# Patient Record
Sex: FEMALE | Race: BLACK OR AFRICAN AMERICAN | Employment: OTHER | URBAN - METROPOLITAN AREA
[De-identification: names, ages, dates, MRNs, and addresses within clinical notes are randomized per-mention and may not be internally consistent; named-entity substitution may affect disease eponyms.]

---

## 2017-04-17 ENCOUNTER — HOSPITAL ENCOUNTER (OUTPATIENT)
Dept: LAB | Age: 82
Discharge: HOME | End: 2017-04-17
Payer: MEDICARE

## 2017-04-17 ENCOUNTER — OFFICE VISIT (OUTPATIENT)
Dept: INTERNAL MEDICINE CLINIC | Age: 82
End: 2017-04-17

## 2017-04-17 VITALS
SYSTOLIC BLOOD PRESSURE: 148 MMHG | RESPIRATION RATE: 22 BRPM | HEART RATE: 77 BPM | TEMPERATURE: 98.1 F | DIASTOLIC BLOOD PRESSURE: 90 MMHG | WEIGHT: 166 LBS | OXYGEN SATURATION: 98 % | BODY MASS INDEX: 29.41 KG/M2 | HEIGHT: 63 IN

## 2017-04-17 DIAGNOSIS — F02.80 DEMENTIA DUE TO MEDICAL CONDITION WITHOUT BEHAVIORAL DISTURBANCE (HCC): ICD-10-CM

## 2017-04-17 DIAGNOSIS — F51.01 PRIMARY INSOMNIA: ICD-10-CM

## 2017-04-17 DIAGNOSIS — I10 ESSENTIAL HYPERTENSION: Primary | ICD-10-CM

## 2017-04-17 DIAGNOSIS — E55.9 VITAMIN D DEFICIENCY: ICD-10-CM

## 2017-04-17 DIAGNOSIS — E78.00 HYPERCHOLESTEROLEMIA: ICD-10-CM

## 2017-04-17 PROCEDURE — 80053 COMPREHEN METABOLIC PANEL: CPT

## 2017-04-17 PROCEDURE — 36415 COLL VENOUS BLD VENIPUNCTURE: CPT

## 2017-04-17 PROCEDURE — 82306 VITAMIN D 25 HYDROXY: CPT

## 2017-04-17 RX ORDER — LORAZEPAM 0.5 MG/1
0.5 TABLET ORAL
Qty: 30 TAB | Refills: 0 | Status: SHIPPED | OUTPATIENT
Start: 2017-04-17 | End: 2021-06-23 | Stop reason: ALTCHOICE

## 2017-04-17 RX ORDER — RIVASTIGMINE 13.3 MG/24H
1 PATCH, EXTENDED RELEASE TRANSDERMAL DAILY
Qty: 30 PATCH | Refills: 5 | Status: SHIPPED | OUTPATIENT
Start: 2017-04-17 | End: 2017-10-26 | Stop reason: SDUPTHER

## 2017-04-17 NOTE — PROGRESS NOTES
HISTORY OF PRESENT ILLNESS  Palma Seip is a 80 y.o. female here for follow up.has dementia,stable. on meds. need refill. Reports insomnia some days. No sundowning. Has HTN,on meds. no need for refill. Need lab work. No chest pain,or palpitation. Follow Up Chronic Condition   Pertinent negatives include no chest pain. Follow-up   Pertinent negatives include no chest pain. Hypertension    Pertinent negatives include no chest pain, no palpitations, no blurred vision and no nausea. Cholesterol Problem   Pertinent negatives include no chest pain. Dementia    Her past medical history is significant for hypertension. Review of Systems   Constitutional: Negative. Negative for chills and fever. HENT: Negative. Eyes: Negative. Negative for blurred vision and double vision. Respiratory: Negative. Negative for cough and hemoptysis. Cardiovascular: Negative. Negative for chest pain and palpitations. Gastrointestinal: Negative. Negative for heartburn and nausea. Genitourinary: Negative. Skin: Negative. Neurological: Negative. Psychiatric/Behavioral: Negative. Physical Exam   Constitutional: She appears well-developed and well-nourished. No distress. Neck: Normal range of motion. Neck supple. No JVD present. No thyromegaly present. Cardiovascular: Normal rate, regular rhythm, normal heart sounds and intact distal pulses. Pulmonary/Chest: Effort normal and breath sounds normal. No respiratory distress. She has no wheezes. Musculoskeletal: She exhibits no edema or tenderness. Psychiatric: She has a normal mood and affect. Her behavior is normal.       ASSESSMENT and Janel Lord was seen today for follow up chronic condition. Diagnoses and all orders for this visit:    Essential hypertension    On lotrel. 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   On namenda and exelon patch.  Will do,  -     METABOLIC PANEL, COMPREHENSIVE    Hypercholesterolemia    Stable. will do,  -     METABOLIC PANEL, COMPREHENSIVE    Vitamin D deficiency  -     VITAMIN D, 25 HYDROXY    Primary insomnia    Will refill,  -     LORazepam (ATIVAN) 0.5 mg tablet; Take 1 Tab by mouth nightly as needed for Anxiety. Max Daily Amount: 0.5 mg. Other orders  -     rivastigmine (EXELON) 13.3 mg/24 hour patch; 1 Patch by TransDERmal route daily. Discussed expected course/resolution/complications of diagnosis in detail with patient. Medication risks/benefits/costs/interactions/alternatives discussed with patient. Pt was given an after visit summary which includes diagnoses, current medications & vitals. Pt expressed understanding with the diagnosis and plan.

## 2017-04-17 NOTE — MR AVS SNAPSHOT
Visit Information Date & Time Provider Department Dept. Phone Encounter #  
 4/17/2017  1:00 PM Kellie Singleton, [address] [facility] [phone] [id number] Upcoming Health Maintenance Date Due DTaP/Tdap/Td series (1 - Tdap) 2/3/1954 GLAUCOMA SCREENING Q2Y 2/3/1998 Pneumococcal 65+ Low/Medium Risk (2 of 2 - PPSV23) 11/25/2013 MEDICARE YEARLY EXAM 8/5/2016 Allergies as of 4/17/2017  Review Complete On: 4/17/2017 By: Kellie Singleton MD  
  
 Severity Noted Reaction Type Reactions Pcn [Penicillins]  03/25/2010    Itching Current Immunizations  Reviewed on 8/5/2015 Name Date Influenza Vaccine Split 10/26/2012, 10/26/2011, 10/11/2010 Pneumococcal Vaccine (Unspecified Type) 11/25/2008 Not reviewed this visit You Were Diagnosed With   
  
 Codes Comments Essential hypertension    -  Primary ICD-10-CM: I10 
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 
ICD-9-CM: 307.42 Vitals BP Pulse Temp Resp Height(growth percentile) Weight(growth percentile) (!) 148/99 77 98.1 °F (36.7 °C) (Oral) 22 5' 3\" (1.6 m) 166 lb (75.3 kg) SpO2 BMI OB Status Smoking Status 98% 29.41 kg/m2 Postmenopausal Never Smoker Vitals History BMI and BSA Data Body Mass Index Body Surface Area  
 29.41 kg/m 2 1.83 m 2 Preferred Pharmacy Pharmacy Name Phone [facility] [address] [facility]  [place] [phone] Your Updated Medication List  
  
   
This list is accurate as of: 4/17/17  2:35 PM.  Always use your most recent med list.  
  
  
  
  
 acetaminophen 650 mg CR tablet Commonly known as:  TYLENOL ARTHRITIS PAIN  
 Take 1 Tab by mouth two (2) times daily as needed for Pain. alendronate 70 mg tablet Commonly known as:  FOSAMAX TAKE 1 TABLET BY MOUTH EVERY   
  
 amLODIPine-benazepril 5-20 mg per capsule Commonly known as:  LOTREL  
TAKE 1 CAPSULE BY MOUTH DAILY CALCIUM 500 WITH D 500 mg(1,250mg) -400 unit Tab Generic drug:  Calcium-Cholecalciferol (D3)  
TAKE 1 TABLET BY MOUTH EVERY DAY  
  
 LORazepam 0.5 mg tablet Commonly known as:  ATIVAN Take 1 Tab by mouth nightly as needed for Anxiety. Max Daily Amount: 0.5 mg.  
  
 NAMENDA XR 28 mg capsule Generic drug:  memantine ER  
TAKE 1 CAPSULE BY MOUTH DAILY  
  
 rivastigmine 13.3 mg/24 hour patch Commonly known as:  EXELON  
1 Patch by TransDERmal route daily. Prescriptions Printed Refills LORazepam (ATIVAN) 0.5 mg tablet 0 Sig: Take 1 Tab by mouth nightly as needed for Anxiety. Max Daily Amount: 0.5 mg.  
 Class: Print Route: Oral  
  
Prescriptions Sent to Pharmacy Refills  
 rivastigmine (EXELON) 13.3 mg/24 hour patch 5 Si Patch by TransDERmal route daily. Class: Normal  
 Pharmacy: [facility] [address] #: [phone] Route: TransDERmal  
  
We Performed the Following METABOLIC PANEL, COMPREHENSIVE [80548 CPT(R)] VITAMIN D, 25 HYDROXY [id number] CPT(R)] Please provide this summary of care documentation to your next provider. Your primary care clinician is listed as Darylene Skinner. If you have any questions after today's visit, please call [phone].

## 2017-04-17 NOTE — PROGRESS NOTES
Chief Complaint   Patient presents with    Follow Up Chronic Condition     Reviewed record  In preparation for visit and have obtained necessary documentation. 1. Have you been to the ER, urgent care clinic since your last visit? Hospitalized since your last visit? No    2. Have you seen or consulted any other health care providers outside of the [address] [place] [address] since your last visit? Include any pap smears or colon screening.  No    Used 2 patient I. D. 's

## 2017-04-17 NOTE — LETTER
4/21/2017 9:09 AM 
 
Ms. Jennifer Krueger [facility] [phone] Dear Jennifer Krueger: 
 
Please find your most recent results below. Resulted Orders METABOLIC PANEL, COMPREHENSIVE Result Value Ref Range Glucose 94 65 - 99 mg/dL BUN 10 8 - 27 mg/dL Creatinine 1.03 (H) 0.57 - 1.00 mg/dL GFR est non-AA 50 (L) >59 mL/min/1.73 GFR est AA 58 (L) >59 mL/min/1.73  
 BUN/Creatinine ratio 10 (L) 12 - 28 Sodium 143 134 - 144 mmol/L Potassium 4.5 3.5 - 5.2 mmol/L Chloride 102 96 - 106 mmol/L  
 CO2 27 18 - 29 mmol/L Calcium 9.7 8.7 - 10.3 mg/dL Protein, total 7.0 6.0 - 8.5 g/dL Albumin 3.9 3.5 - 4.7 g/dL GLOBULIN, TOTAL 3.1 1.5 - 4.5 g/dL A-G Ratio 1.3 1.2 - 2.2 Bilirubin, total 0.2 0.0 - 1.2 mg/dL Alk. phosphatase 87 39 - 117 IU/L  
 AST (SGOT) 18 0 - 40 IU/L  
 ALT (SGPT) 11 0 - 32 IU/L Narrative Performed at:  [address]  [id number] : Nelsy Alston MD, Phone:  [phone] VITAMIN D, 25 HYDROXY Result Value Ref Range VITAMIN D, 25-HYDROXY 31.5 30.0 - 100.0 ng/mL Comment:  
   Vitamin D deficiency has been defined by the [facility] practice guideline as a 
level of serum 25-OH vitamin D less than 20 ng/mL (1,2). The Endocrine Society went on to further define vitamin D 
insufficiency as a level between 21 and 29 ng/mL (2). 1. IOM ([place] of Medicine). 2010. Dietary reference 
   intakes for calcium and D. [facility]: The 
   [employer]. 2. Bienvenido MF, Sudhakar NC, Shan RING, et al. 
   Evaluation, treatment, and prevention of vitamin D 
   deficiency: an Endocrine Society clinical practice 
   guideline. JCEM. 2011 Jul; 96(7):0751-30. Narrative Performed at:  [address]  [id number] : Nelsy Alston MD, Phone:  [phone] CKD REPORT  
 Result Value Ref Range Interpretation Note Comment:  
   Supplement report is available. Narrative Performed at:  [address] [place] [address]  [id number] : Sarkis Portillo PhD, Phone:  [phone] RECOMMENDATIONS: 
 
Stable. Please call me if you have any questions: [phone] Sincerely, Jarred Knox MD

## 2017-04-18 LAB
25(OH)D3+25(OH)D2 SERPL-MCNC: 31.5 NG/ML (ref 30–100)
ALBUMIN SERPL-MCNC: 3.9 G/DL (ref 3.5–4.7)
ALBUMIN/GLOB SERPL: 1.3 {RATIO} (ref 1.2–2.2)
ALP SERPL-CCNC: 87 IU/L (ref 39–117)
ALT SERPL-CCNC: 11 IU/L (ref 0–32)
AST SERPL-CCNC: 18 IU/L (ref 0–40)
BILIRUB SERPL-MCNC: 0.2 MG/DL (ref 0–1.2)
BUN SERPL-MCNC: 10 MG/DL (ref 8–27)
BUN/CREAT SERPL: 10 (ref 12–28)
CALCIUM SERPL-MCNC: 9.7 MG/DL (ref 8.7–10.3)
CHLORIDE SERPL-SCNC: 102 MMOL/L (ref 96–106)
CO2 SERPL-SCNC: 27 MMOL/L (ref 18–29)
CREAT SERPL-MCNC: 1.03 MG/DL (ref 0.57–1)
GLOBULIN SER CALC-MCNC: 3.1 G/DL (ref 1.5–4.5)
GLUCOSE SERPL-MCNC: 94 MG/DL (ref 65–99)
INTERPRETATION: NORMAL
POTASSIUM SERPL-SCNC: 4.5 MMOL/L (ref 3.5–5.2)
PROT SERPL-MCNC: 7 G/DL (ref 6–8.5)
SODIUM SERPL-SCNC: 143 MMOL/L (ref 134–144)

## 2017-10-16 ENCOUNTER — OFFICE VISIT (OUTPATIENT)
Dept: INTERNAL MEDICINE CLINIC | Age: 82
End: 2017-10-16

## 2017-10-16 ENCOUNTER — HOSPITAL ENCOUNTER (OUTPATIENT)
Dept: LAB | Age: 82
Discharge: HOME | End: 2017-10-16
Payer: MEDICARE

## 2017-10-16 VITALS
WEIGHT: 161 LBS | OXYGEN SATURATION: 100 % | BODY MASS INDEX: 28.53 KG/M2 | HEART RATE: 62 BPM | DIASTOLIC BLOOD PRESSURE: 78 MMHG | TEMPERATURE: 97.3 F | SYSTOLIC BLOOD PRESSURE: 126 MMHG | HEIGHT: 63 IN | RESPIRATION RATE: 20 BRPM

## 2017-10-16 DIAGNOSIS — F02.80 DEMENTIA DUE TO MEDICAL CONDITION WITHOUT BEHAVIORAL DISTURBANCE (HCC): Primary | ICD-10-CM

## 2017-10-16 DIAGNOSIS — I10 ESSENTIAL HYPERTENSION: ICD-10-CM

## 2017-10-16 DIAGNOSIS — G47.00 INSOMNIA, UNSPECIFIED TYPE: ICD-10-CM

## 2017-10-16 DIAGNOSIS — Z00.00 MEDICARE ANNUAL WELLNESS VISIT, SUBSEQUENT: ICD-10-CM

## 2017-10-16 DIAGNOSIS — M17.9 OSTEOARTHRITIS OF KNEE, UNSPECIFIED LATERALITY, UNSPECIFIED OSTEOARTHRITIS TYPE: ICD-10-CM

## 2017-10-16 DIAGNOSIS — Z13.5 SCREENING FOR GLAUCOMA: ICD-10-CM

## 2017-10-16 DIAGNOSIS — Z23 ENCOUNTER FOR IMMUNIZATION: ICD-10-CM

## 2017-10-16 DIAGNOSIS — E78.00 HYPERCHOLESTEROLEMIA: ICD-10-CM

## 2017-10-16 PROCEDURE — 80053 COMPREHEN METABOLIC PANEL: CPT

## 2017-10-16 PROCEDURE — 36415 COLL VENOUS BLD VENIPUNCTURE: CPT

## 2017-10-16 PROCEDURE — 80061 LIPID PANEL: CPT

## 2017-10-16 PROCEDURE — 85027 COMPLETE CBC AUTOMATED: CPT

## 2017-10-16 RX ORDER — MELOXICAM 7.5 MG/1
TABLET ORAL
Qty: 90 TAB | Refills: 5 | Status: SHIPPED | OUTPATIENT
Start: 2017-10-16 | End: 2019-01-08 | Stop reason: SDUPTHER

## 2017-10-16 RX ORDER — MELOXICAM 7.5 MG/1
7.5 TABLET ORAL DAILY
Qty: 30 TAB | Refills: 5 | Status: SHIPPED | OUTPATIENT
Start: 2017-10-16 | End: 2017-10-16 | Stop reason: SDUPTHER

## 2017-10-16 RX ORDER — DOXEPIN HYDROCHLORIDE 10 MG/1
CAPSULE ORAL
Qty: 90 CAP | Refills: 2 | Status: SHIPPED | OUTPATIENT
Start: 2017-10-16 | End: 2019-10-14 | Stop reason: ALTCHOICE

## 2017-10-16 RX ORDER — DOXEPIN HYDROCHLORIDE 10 MG/1
10 CAPSULE ORAL
Qty: 30 CAP | Refills: 2 | Status: SHIPPED | OUTPATIENT
Start: 2017-10-16 | End: 2017-10-16 | Stop reason: SDUPTHER

## 2017-10-16 NOTE — PATIENT INSTRUCTIONS
Schedule of Personalized Health Plan  (Provide Copy to Patient)  The best way to stay healthy is to live a healthy lifestyle. A healthy lifestyle includes regular exercise, eating a well-balanced diet, keeping a healthy weight and not smoking. Regular physical exams and screening tests are another important way to take care of yourself. Preventive exams provided by health care providers can find health problems early when treatment works best and can keep you from getting certain diseases or illnesses. Preventive services include exams, lab tests, screenings, shots, monitoring and information to help you take care of your own health. All people over 65 should have a pneumonia shot. Pneumonia shots are usually only needed once in a lifetime unless your doctor decides differently. Ordered. All people over 65 should have a yearly flu shot. ordered. People over 65 are at medium to high risk for Hepatitis B. Three shots are needed for complete protection. In addition to your physical exam, some screening tests are recommended:    Bone mass measurement (dexa scan) is recommended every two years  Diabetes Mellitus screening is recommended every year. Glaucoma is an eye disease caused by high pressure in the eye. An eye exam is recommended every year. Ordered. Cardiovascular screening tests that check your cholesterol and other blood fat (lipid) levels are recommended every five years. Up tod ate    Colorectal Cancer screening tests help to find pre-cancerous polyps (growths in the colon) so they can be removed before they turn into cancer. Tests ordered for screening depend on your personal and family history risk factors. N/A    Screening for Breast Cancer is recommended yearly with a mammogram.N/A    Screening for Cervical Cancer is recommended every two years (annually for certain risk factors, such as previous history of STD or abnormal PAP in past 7 years), with a Pelvic Exam with PAP    Here is a list of your current Health Maintenance items with a due date:  Health Maintenance   Topic Date Due    DTaP/Tdap/Td series (1 - Tdap) 02[birth year]03/1954    GLAUCOMA SCREENING Q2Y  02/03/1998    Pneumococcal 65+ Low/Medium Risk (2 of 2 - PPSV23) 11/25/2013    MEDICARE YEARLY EXAM  08/05/2016    INFLUENZA AGE 9 TO ADULT  08/01/2017    OSTEOPOROSIS SCREENING (DEXA)  Completed    ZOSTER VACCINE AGE 60>  Addressed       Vaccine Information Statement    Influenza (Flu) Vaccine (Inactivated or Recombinant): What you need to know    Many Vaccine Information Statements are available in [place] and other languages. See www.immunize.org/vis  Hojas de Información Sobre Vacunas están disponibles en Español y en muchos otros idiomas. Visite www.immunize.org/vis    1. Why get vaccinated? Influenza (flu) is a contagious disease that spreads around the [facility] every year, usually between October and May. Flu is caused by influenza viruses, and is spread mainly by coughing, sneezing, and close contact. Anyone can get flu. Flu strikes suddenly and can last several days. Symptoms vary by age, but can include:   fever/chills   sore throat   muscle aches   fatigue   cough   headache    runny or stuffy nose    Flu can also lead to pneumonia and blood infections, and cause diarrhea and seizures in children. If you have a medical condition, such as heart or lung disease, flu can make it worse. Flu is more dangerous for some people. Infants and young children, people 72years of age and older, pregnant women, and people with certain health conditions or a weakened immune system are at greatest risk. Each year thousands of people in the [facility] die from flu, and many more are hospitalized. Flu vaccine can:   keep you from getting flu,   make flu less severe if you do get it, and   keep you from spreading flu to your family and other people.      2. Inactivated and recombinant flu vaccines    A dose of flu vaccine is recommended every flu season. Children 6 months through 6years of age may need two doses during the same flu season. Everyone else needs only one dose each flu season. Some inactivated flu vaccines contain a very small amount of a mercury-based preservative called thimerosal. Studies have not shown thimerosal in vaccines to be harmful, but flu vaccines that do not contain thimerosal are available. There is no live flu virus in flu shots. They cannot cause the flu. There are many flu viruses, and they are always changing. Each year a new flu vaccine is made to protect against three or four viruses that are likely to cause disease in the upcoming flu season. But even when the vaccine doesnt exactly match these viruses, it may still provide some protection    Flu vaccine cannot prevent:   flu that is caused by a virus not covered by the vaccine, or   illnesses that look like flu but are not. It takes about 2 weeks for protection to develop after vaccination, and protection lasts through the flu season. 3. Some people should not get this vaccine    Tell the person who is giving you the vaccine:     If you have any severe, life-threatening allergies. If you ever had a life-threatening allergic reaction after a dose of flu vaccine, or have a severe allergy to any part of this vaccine, you may be advised not to get vaccinated. Most, but not all, types of flu vaccine contain a small amount of egg protein.  If you ever had Guillain-Barré Syndrome (also called GBS). Some people with a history of GBS should not get this vaccine. This should be discussed with your doctor.  If you are not feeling well. It is usually okay to get flu vaccine when you have a mild illness, but you might be asked to come back when you feel better. 4. Risks of a vaccine reaction    With any medicine, including vaccines, there is a chance of reactions.  These are usually mild and go away on their own, but serious reactions are also possible. Most people who get a flu shot do not have any problems with it. Minor problems following a flu shot include:    soreness, redness, or swelling where the shot was given     hoarseness   sore, red or itchy eyes   cough   fever   aches   headache   itching   fatigue  If these problems occur, they usually begin soon after the shot and last 1 or 2 days. More serious problems following a flu shot can include the following:     There may be a small increased risk of Guillain-Barré Syndrome (GBS) after inactivated flu vaccine. This risk has been estimated at 1 or 2 additional cases per million people vaccinated. This is much lower than the risk of severe complications from flu, which can be prevented by flu vaccine.  Young children who get the flu shot along with pneumococcal vaccine (PCV13) and/or DTaP vaccine at the same time might be slightly more likely to have a seizure caused by fever. Ask your doctor for more information. Tell your doctor if a child who is getting flu vaccine has ever had a seizure. Problems that could happen after any injected vaccine:      People sometimes faint after a medical procedure, including vaccination. Sitting or lying down for about 15 minutes can help prevent fainting, and injuries caused by a fall. Tell your doctor if you feel dizzy, or have vision changes or ringing in the ears.  Some people get severe pain in the shoulder and have difficulty moving the arm where a shot was given. This happens very rarely.  Any medication can cause a severe allergic reaction. Such reactions from a vaccine are very rare, estimated at about 1 in a million doses, and would happen within a few minutes to a few hours after the vaccination. As with any medicine, there is a very remote chance of a vaccine causing a serious injury or death. The safety of vaccines is always being monitored.  For more information, visit: www.cdc.gov/vaccinesafety/    5. What if there is a serious reaction? What should I look for?  Look for anything that concerns you, such as signs of a severe allergic reaction, very high fever, or unusual behavior. Signs of a severe allergic reaction can include hives, swelling of the face and throat, difficulty breathing, a fast heartbeat, dizziness, and weakness  usually within a few minutes to a few hours after the vaccination. What should I do?  If you think it is a severe allergic reaction or other emergency that cant wait, call 9-1-1 and get the person to the nearest hospital. Otherwise, call your doctor.  Reactions should be reported to the Vaccine Adverse Event Reporting System (VAERS). Your doctor should file this report, or you can do it yourself through  the VAERS web site at www.vaers. hhs.gov, or by calling 9-514.626.3898. VAERS does not give medical advice. 6. The National Vaccine Injury Compensation Program    The [facility] Vaccine Injury Compensation Program (VICP) is a federal program that was created to compensate people who may have been injured by certain vaccines. Persons who believe they may have been injured by a vaccine can learn about the program and about filing a claim by calling 6-192.257.6600 or visiting the [facility] [employer] website at www.[facility].gov/vaccinecompensation. There is a time limit to file a claim for compensation. 7. How can I learn more?  Ask your healthcare provider. He or she can give you the vaccine package insert or suggest other sources of information.  Call your local or state health department.  Contact the Centers for Disease Control and Prevention (CDC):  - Call 2-468.738.3227 (1-800-CDC-INFO) or  - Visit CDCs website at www.cdc.gov/flu    Vaccine Information Statement   Inactivated Influenza Vaccine   8/7/2015  42 ROMIE Pitts [id number]    Department of Health and Human Services  Centers for Disease Control and Prevention    Office Use Only    Vaccine Information Statement     Pneumococcal Conjugate Vaccine (PCV13): What You Need to Know    Many Vaccine Information Statements are available in [place] and other languages. See www.immunize.org/vis. Hojas de información Sobre Vacunas están disponibles en español y en muchos otros idiomas. Visite www.immunize.org/vis. 1. Why get vaccinated? Vaccination can protect both children and adults from pneumococcal disease. Pneumococcal disease is caused by bacteria that can spread from person to person through close contact. It can cause ear infections, and it can also lead to more serious infections of the:   Lungs (pneumonia),   Blood (bacteremia), and   Covering of the brain and spinal cord (meningitis). Pneumococcal pneumonia is most common among adults. Pneumococcal meningitis can cause deafness and brain damage, and it kills about 1 child in 10 who get it. Anyone can get pneumococcal disease, but children under 3years of age and adults 72 years and older, people with certain medical conditions, and cigarette smokers are at the highest risk. Before there was a vaccine, the [facility] saw:   more than 700 cases of meningitis,   about 13,000 blood infections,   about 5 million ear infections, and   about 200 deaths  in children under 5 each year from pneumococcal disease. Since vaccine became available, severe pneumococcal disease in these children has fallen by 88%. About 18,000 older adults die of pneumococcal disease each year in the [place]. Treatment of pneumococcal infections with penicillin and other drugs is not as effective as it used to be, because some strains of the disease have become resistant to these drugs. This makes prevention of the disease, through vaccination, even more important. 2. PCV13 vaccine    Pneumococcal conjugate vaccine (called PCV13) protects against 13 types of pneumococcal bacteria.       PCV13 is routinely given to children at 2, 4, 6, and 1515 months of age. It is also recommended for children and adults 3to 59years of age with certain health conditions, and for all adults 72years of age and older. Your doctor can give you details. 3. Some people should not get this vaccine    Anyone who has ever had a life-threatening allergic reaction to a dose of this vaccine, to an earlier pneumococcal vaccine called PCV7, or to any vaccine containing diphtheria toxoid (for example, DTaP), should not get PCV13. Anyone with a severe allergy to any component of PCV13 should not get the vaccine. Tell your doctor if the person being vaccinated has any severe allergies. If the person scheduled for vaccination is not feeling well, your healthcare provider might decide to reschedule the shot on another day. 4. Risks of a vaccine reaction    With any medicine, including vaccines, there is a chance of reactions. These are usually mild and go away on their own, but serious reactions are also possible. Problems reported following PCV13 varied by age and dose in the series. The most common problems reported among children were:    About half became drowsy after the shot, had a temporary loss of appetite, or had redness or tenderness where the shot was given.  About 1 out of 3 had swelling where the shot was given.  About 1 out of 3 had a mild fever, and about 1 in 20 had a fever over 102.2°F.   Up to about 8 out of 10 became fussy or irritable. Adults have reported pain, redness, and swelling where the shot was given; also mild fever, fatigue, headache, chills, or muscle pain. Francy Lord children who get PCV13 along with inactivated flu vaccine at the same time may be at increased risk for seizures caused by fever. Ask your doctor for more information. Problems that could happen after any vaccine:     People sometimes faint after a medical procedure, including vaccination.  Sitting or lying down for about 15 minutes can help prevent fainting, and injuries caused by a fall. Tell your doctor if you feel dizzy, or have vision changes or ringing in the ears.  Some older children and adults get severe pain in the shoulder and have difficulty moving the arm where a shot was given. This happens very rarely.  Any medication can cause a severe allergic reaction. Such reactions from a vaccine are very rare, estimated at about 1 in a million doses, and would happen within a few minutes to a few hours after the vaccination. As with any medicine, there is a very small chance of a vaccine causing a serious injury or death. The safety of vaccines is always being monitored. For more information, visit: www.cdc.gov/vaccinesafety/     5. What if there is a serious reaction? What should I look for?  Look for anything that concerns you, such as signs of a severe allergic reaction, very high fever, or unusual behavior. Signs of a severe allergic reaction can include hives, swelling of the face and throat, difficulty breathing, a fast heartbeat, dizziness, and weakness  usually within a few minutes to a few hours after the vaccination. What should I do?  If you think it is a severe allergic reaction or other emergency that cant wait, call 9-1-1 or get the person to the nearest hospital. Otherwise, call your doctor. Reactions should be reported to the Vaccine Adverse Event Reporting System (VAERS). Your doctor should file this report, or you can do it yourself through the VAERS web site at www.vaers. hhs.gov, or by calling 2-213.484.8364. VAERS does not give medical advice. 6. The National Vaccine Injury Compensation Program    The [facility] Vaccine Injury Compensation Program (VICP) is a federal program that was created to compensate people who may have been injured by certain vaccines.     Persons who believe they may have been injured by a vaccine can learn about the program and about filing a claim by calling 9-600.629.9209 or visiting the [address] website at www.[facility].gov/vaccinecompensation. There is a time limit to file a claim for compensation. 7. How can I learn more?  Ask your healthcare provider. He or she can give you the vaccine package insert or suggest other sources of information.  Call your local or state health department.  Contact the Centers for Disease Control and Prevention (CDC):  - Call 3-656.726.7313 (1-800-CDC-INFO) or  - Visit CDCs website at www.cdc.gov/vaccines    Vaccine Information Statement   PCV13 Vaccine   11/5/2015   42 ROMIE Almodovar [id number]    Department of Health and Human Services  Centers for Disease Control and Prevention    Office Use Only

## 2017-10-16 NOTE — LETTER
NOTIFICATION RETURN TO WORK / SCHOOL 
 
10/16/2017 9:45 AM 
 
Ms. Luli Olivo [facility] 7 [phone] To Whom It May Concern: 
 
Luli Olivo is currently under the care of [address] [place]. Her son Pao Keene brought her into the office Monday October 16, 2017 for an  
8:45 appointment. If there are questions or concerns please have the patient contact our office. Sincerely, Thanh Nolasco MD

## 2017-10-16 NOTE — PROGRESS NOTES
Health Maintenance Due   Topic Date Due    DTaP/Tdap/Td series (1 - Tdap) 02/03/1954    GLAUCOMA SCREENING Q2Y  02/03/1998    Pneumococcal 65+ Low/Medium Risk (2 of 2 - PPSV23) 11/25/2013    MEDICARE YEARLY EXAM  08/05/2016    INFLUENZA AGE 9 TO ADULT  08/01/2017       Chief Complaint   Patient presents with    Hypertension     6 month follow up    Dementia    Cholesterol Problem    Annual Wellness Visit    Insomnia     son states ativan not effective, pt is up all night every night       1. Have you been to the ER, urgent care clinic since your last visit? Hospitalized since your last visit? No    2. Have you seen or consulted any other health care providers outside of the [address] [place] [address] since your last visit? Include any pap smears or colon screening. No    3) Do you have an Advance Directive on file? yes    4) Are you interested in receiving information on Advance Directives? NO      Patient is accompanied by son I have received verbal consent from Marianne Tobias to discuss any/all medical information while they are present in the room.

## 2017-10-16 NOTE — LETTER
10/24/2017 5:34 PM 
 
Ms. Carolina Sullivan [facility] [phone] Dear Carolina Sullivan: 
 
Please find your most recent results below. Resulted Orders METABOLIC PANEL, COMPREHENSIVE Result Value Ref Range Glucose 104 (H) 65 - 99 mg/dL BUN 10 8 - 27 mg/dL Creatinine 1.26 (H) 0.57 - 1.00 mg/dL GFR est non-AA 39 (L) >59 mL/min/1.73 GFR est AA 45 (L) >59 mL/min/1.73  
 BUN/Creatinine ratio 8 (L) 12 - 28 Sodium 142 134 - 144 mmol/L Potassium 4.7 3.5 - 5.2 mmol/L Chloride 102 96 - 106 mmol/L  
 CO2 25 18 - 29 mmol/L Calcium 9.2 8.7 - 10.3 mg/dL Protein, total 7.2 6.0 - 8.5 g/dL Albumin 4.1 3.5 - 4.7 g/dL GLOBULIN, TOTAL 3.1 1.5 - 4.5 g/dL A-G Ratio 1.3 1.2 - 2.2 Bilirubin, total 0.4 0.0 - 1.2 mg/dL Alk. phosphatase 89 39 - 117 IU/L  
 AST (SGOT) 18 0 - 40 IU/L  
 ALT (SGPT) 10 0 - 32 IU/L Narrative Performed at:  [address]  [id number] : Gayathri Mendiola MD, Phone:  [phone] CBC W/O DIFF Result Value Ref Range WBC 4.9 3.4 - 10.8 x10E3/uL  
 RBC 5.67 (H) 3.77 - 5.28 x10E6/uL HGB 13.0 11.1 - 15.9 g/dL HCT 39.0 34.0 - 46.6 % MCV 69 (L) 79 - 97 fL  
 MCH 22.9 (L) 26.6 - 33.0 pg  
 MCHC 33.3 31.5 - 35.7 g/dL  
 RDW 15.4 12.3 - 15.4 % PLATELET 878 497 - 102 x10E3/uL Narrative Performed at:  [address]  [id number] : Gayathri Mendiola MD, Phone:  [phone] LIPID PANEL Result Value Ref Range Cholesterol, total 184 100 - 199 mg/dL Triglyceride 75 0 - 149 mg/dL HDL Cholesterol 49 >39 mg/dL VLDL, calculated 15 5 - 40 mg/dL LDL, calculated 120 (H) 0 - 99 mg/dL Narrative Performed at:  [address]  [id number] : Gayathri Mendiola MD, Phone:  [phone] CVD REPORT Result Value Ref Range  INTERPRETATION NTAP   
 PDF IMAGE Not applicable Narrative Performed at:  [address] [place] [address]  [id number] : Mohamud Hood PhD, Phone:  [phone] CKD REPORT Result Value Ref Range Interpretation Note Comment:  
   Supplement report is available. Narrative Performed at:  [address] [place] [address]  [id number] : Mohamud Hood PhD, Phone:  [phone] RECOMMENDATIONS: 
 
Slightly reduced kidney function. need tod rink more fluid. all other labs are stable. Please call me if you have any questions: [phone] Sincerely, Thanh Nolasco MD

## 2017-10-16 NOTE — PROGRESS NOTES
Chayito Bowen is a 80 y.o. female and presents for annual Medicare Wellness Visit. Problem List: Reviewed with patient and discussed risk factors. 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    Depressive disorder, not elsewhere classified F32.9    Anxiety state, unspecified F41.1    Moderate dementia F03.90    Advance care planning Z71.89       Current medical providers:  Patient Care Team:  Brian Day MD as PCP - General (Internal Medicine)  Candice Otero, RN as Ambulatory Care Navigator  Maryanne Wellington, RN as Nurse Navigator  [facility] A Shawn Tyson as Physician (Psychology)  Dianna Rush LPN as Ambulatory Care Navigator (Internal Medicine)    PSH: Reviewed with patient  Past Surgical History:   Procedure Laterality Date    CARDIAC CATHETERIZATION  10/1990    normal    COLONOSCOPY  [id number]    diverticuli, hemorrhoids    EGD      x 3    EGD  [id number]    gastritis, duodenitis    HX CATARACT REMOVAL      bilateral    HX GYN      BTL    HX HEENT      tonsillectomy    HX ORTHOPAEDIC  2/2011    right knee replacement- Dr. Parag Srerato    HX ORTHOPAEDIC  12/10/12     LEFT TOTAL KNEE ARTHROPLASTY  WITH NAVIGATION (EPI W SPINAL ANES).     HX OTHER SURGICAL  02/2002    removal of meningioma    HX OTHER SURGICAL      colonoscopy/EGD    NEUROLOGICAL PROCEDURE UNLISTED      brain tumor resection yrs back        SH: Reviewed with patient  Social History   Substance Use Topics    Smoking status: Never Smoker    Smokeless tobacco: Never Used    Alcohol use No       FH: Reviewed with patient  Family History   Problem Relation Age of Onset    Hypertension Mother     Elevated Lipids Mother     Dementia Mother     Elevated Lipids Sister     Dementia Sister     Cancer Brother      colon  No Known Problems Child     No Known Problems Child     No Known Problems Child     No Known Problems Child        Medications/Allergies: Reviewed with patient  Current Outpatient Prescriptions on File Prior to Visit   Medication Sig Dispense Refill    rivastigmine (EXELON) 13.3 mg/24 hour patch 1 Patch by TransDERmal route daily. 30 Patch 5    LORazepam (ATIVAN) 0.5 mg tablet Take 1 Tab by mouth nightly as needed for Anxiety. Max Daily Amount: 0.5 mg. 30 Tab 0    amLODIPine-benazepril (LOTREL) 5-20 mg per capsule TAKE 1 CAPSULE BY MOUTH DAILY 90 Cap 3    NAMENDA XR 28 mg capsule TAKE 1 CAPSULE BY MOUTH DAILY 90 Cap 5    alendronate (FOSAMAX) 70 mg tablet TAKE 1 TABLET BY MOUTH EVERY SUNDAY 12 Tab 12    CALCIUM 500 WITH D 500 mg(1,250mg) -400 unit tab TAKE 1 TABLET BY MOUTH EVERY DAY 30 Tab 0    acetaminophen (TYLENOL ARTHRITIS PAIN) 650 mg CR tablet Take 1 Tab by mouth two (2) times daily as needed for Pain. 60 Tab 3     No current facility-administered medications on file prior to visit. Allergies   Allergen Reactions    Pcn [Penicillins] Itching       Objective:  Visit Vitals    /78 (BP 1 Location: Left arm, BP Patient Position: Sitting)    Pulse 62    Temp 97.3 °F (36.3 °C) (Oral)    Resp 20    Ht 5' 3\" (1.6 m)    Wt 161 lb (73 kg)    SpO2 100%    BMI 28.52 kg/m2    Body mass index is 28.52 kg/(m^2). Assessment of cognitive impairment: Alert and oriented x 3    Depression Screen:   PHQ over the last two weeks 8/5/2015   Little interest or pleasure in doing things Several days   Feeling down, depressed or hopeless Several days   Total Score PHQ 2 2       Fall Risk Assessment:    Fall Risk Assessment, last 12 mths 10/16/2017   Able to walk? Yes   Fall in past 12 months? No       Functional Ability:   Does the patient exhibit a steady gait? yes   How long did it take the patient to get up and walk from a sitting position?  30 sec   Is the patient self reliant?  (ie can do own laundry, meals, household chores)  no     Does the patient handle his/her own medications?  no     Does the patient handle his/her own money? no     Is the patients home safe (ie good lighting, handrails on stairs and bath, etc.)? no     Did you notice or did patient express any hearing difficulties? yes     Did you notice or did patient express any vision difficulties?   no     Were distance and reading eye charts used? no       Advance Care Planning:   Patient was offered the opportunity to discuss advance care planning:  yes     Does patient have an Advance Directive:  yes   If no, did you provide information on [facility]? yes       Plan:      Orders Placed This Encounter    PNEUMOCOCCAL CONJ VACCINE 13 VALENT IM    INFLUENZA VIRUS VACCINE, HIGH DOSE SEASONAL, PRESERVATIVE FREE    METABOLIC PANEL, COMPREHENSIVE    CBC W/O DIFF    LIPID PANEL    doxepin (SINEQUAN) 10 mg capsule    meloxicam (MOBIC) 7.5 mg tablet       Health Maintenance   Topic Date Due    DTaP/Tdap/Td series (1 - Tdap) 02/03/1954    GLAUCOMA SCREENING Q2Y  02/03/1998    Pneumococcal 65+ Low/Medium Risk (2 of 2 - PPSV23) 11/25/2013    MEDICARE YEARLY EXAM  08/05/2016    INFLUENZA AGE 9 TO ADULT  08/01/2017    OSTEOPOROSIS SCREENING (DEXA)  Completed    ZOSTER VACCINE AGE 60>  Addressed       *Patient verbalized understanding and agreement with the plan. A copy of the After Visit Summary with personalized health plan was given to the patient today.

## 2017-10-16 NOTE — MR AVS SNAPSHOT
Visit Information Date & Time Provider Department Dept. Phone Encounter #  
 10/16/2017  8:45 AM Saul Walker, [address] [facility] [phone] [id number] Upcoming Health Maintenance Date Due DTaP/Tdap/Td series (1 - Tdap) 2/3/1954 GLAUCOMA SCREENING Q2Y 2/3/1998 Pneumococcal 65+ Low/Medium Risk (2 of 2 - PPSV23) 11/25/2013 MEDICARE YEARLY EXAM 8/5/2016 INFLUENZA AGE 9 TO ADULT 8/1/2017 Allergies as of 10/16/2017  Review Complete On: 10/16/2017 By: Saul Walker MD  
  
 Severity Noted Reaction Type Reactions Pcn [Penicillins]  03/25/2010    Itching Current Immunizations  Reviewed on 10/16/2017 Name Date Influenza High Dose Vaccine PF  Incomplete Influenza Vaccine Split 10/26/2012, 10/26/2011, 10/11/2010 Pneumococcal Conjugate (PCV-13)  Incomplete ZZZ-RETIRED (DO NOT USE) Pneumococcal Vaccine (Unspecified Type) 11/25/2008 Reviewed by Saul Walker MD on 10/16/2017 at  9:23 AM  
You Were Diagnosed With   
  
 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 
ICD-9-CM: 401.9 Hypercholesterolemia     ICD-10-CM: E78.00 ICD-9-CM: 272.0 Insomnia, unspecified type     ICD-10-CM: G47.00 ICD-9-CM: 780.52 Osteoarthritis of knee, unspecified laterality, unspecified osteoarthritis type     ICD-10-CM: M17.10 ICD-9-CM: 715.36 Encounter for immunization     ICD-10-CM: D50 ICD-9-CM: V03.89 Screening for glaucoma     ICD-10-CM: Z13.5 ICD-9-CM: V80.1 Vitals BP Pulse Temp Resp Height(growth percentile) Weight(growth percentile) 126/78 (BP 1 Location: Left arm, BP Patient Position: Sitting) 62 97.3 °F (36.3 °C) (Oral) 20 5' 3\" (1.6 m) 161 lb (73 kg) SpO2 BMI OB Status Smoking Status 100% 28.52 kg/m2 Postmenopausal Never Smoker Vitals History BMI and BSA Data Body Mass Index Body Surface Area 28.52 kg/m 2 1.8 m 2 Preferred Pharmacy Pharmacy Name Phone [facility] [address]  [place] [phone] Your Updated Medication List  
  
   
This list is accurate as of: 10/16/17  9:36 AM.  Always use your most recent med list.  
  
  
  
  
 acetaminophen 650 mg CR tablet Commonly known as:  TYLENOL ARTHRITIS PAIN Take 1 Tab by mouth two (2) times daily as needed for Pain. alendronate 70 mg tablet Commonly known as:  FOSAMAX TAKE 1 TABLET BY MOUTH EVERY SUNDAY  
  
 amLODIPine-benazepril 5-20 mg per capsule Commonly known as:  LOTREL  
TAKE 1 CAPSULE BY MOUTH DAILY CALCIUM 500 WITH D 500 mg(1,250mg) -400 unit Tab Generic drug:  Calcium-Cholecalciferol (D3)  
TAKE 1 TABLET BY MOUTH EVERY DAY  
  
 doxepin 10 mg capsule Commonly known as:  SINEquan Take 1 Cap by mouth nightly. D/C ativan LORazepam 0.5 mg tablet Commonly known as:  ATIVAN Take 1 Tab by mouth nightly as needed for Anxiety. Max Daily Amount: 0.5 mg.  
  
 meloxicam 7.5 mg tablet Commonly known as:  MOBIC Take 1 Tab by mouth daily. NAMENDA XR 28 mg capsule Generic drug:  memantine ER  
TAKE 1 CAPSULE BY MOUTH DAILY  
  
 rivastigmine 13.3 mg/24 hour patch Commonly known as:  EXELON  
1 Patch by TransDERmal route daily. Prescriptions Sent to Pharmacy Refills  
 doxepin (SINEQUAN) 10 mg capsule 2 Sig: Take 1 Cap by mouth nightly. D/C ativan Class: Normal  
 Pharmacy: [facility] [address] Ph #: [phone] Route: Oral  
 meloxicam (MOBIC) 7.5 mg tablet 5 Sig: Take 1 Tab by mouth daily. Class: Normal  
 Pharmacy: [facility] [address] Ph #: [phone]  Route: Oral  
  
 We Performed the Following CBC W/O DIFF [49885 CPT(R)] INFLUENZA VIRUS VACCINE, HIGH DOSE SEASONAL, PRESERVATIVE FREE [05519 CPT(R)] LIPID PANEL [75931 CPT(R)] METABOLIC PANEL, COMPREHENSIVE [62554 CPT(R)] PNEUMOCOCCAL CONJ VACCINE 13 VALENT IM [id number] CPT(R)] REFERRAL TO OPHTHALMOLOGY [REF57 Custom] Comments:  
 Glaucoma screening Referral Information Referral ID Referred By Referred To  
  
 [id number] KATIE [facility] [address] [facility], [address] Visits Status Start Date End Date 1 New Request 10/16/17 10/16/18 If your referral has a status of pending review or denied, additional information will be sent to support the outcome of this decision. Patient Instructions Schedule of Personalized Health Plan (Provide Copy to Patient) The best way to stay healthy is to live a healthy lifestyle. A healthy lifestyle includes regular exercise, eating a well-balanced diet, keeping a healthy weight and not smoking. Regular physical exams and screening tests are another important way to take care of yourself. Preventive exams provided by health care providers can find health problems early when treatment works best and can keep you from getting certain diseases or illnesses. Preventive services include exams, lab tests, screenings, shots, monitoring and information to help you take care of your own health. All people over 65 should have a pneumonia shot. Pneumonia shots are usually only needed once in a lifetime unless your doctor decides differently. Ordered. All people over 65 should have a yearly flu shot. ordered. People over 65 are at medium to high risk for Hepatitis B. Three shots are needed for complete protection. In addition to your physical exam, some screening tests are recommended: 
 
Bone mass measurement (dexa scan) is recommended every two years Diabetes Mellitus screening is recommended every year. Glaucoma is an eye disease caused by high pressure in the eye. An eye exam is recommended every year. Ordered. Cardiovascular screening tests that check your cholesterol and other blood fat (lipid) levels are recommended every five years. Up tod ate Colorectal Cancer screening tests help to find pre-cancerous polyps (growths in the colon) so they can be removed before they turn into cancer. Tests ordered for screening depend on your personal and family history risk factors. N/A Screening for Breast Cancer is recommended yearly with a mammogram.N/A Screening for Cervical Cancer is recommended every two years (annually for certain risk factors, such as previous history of STD or abnormal PAP in past 7 years), with a Pelvic Exam with PAP Here is a list of your current Health Maintenance items with a due date: 
Health Maintenance Topic Date Due  
 DTaP/Tdap/Td series (1 - Tdap) 02/03/1954  GLAUCOMA SCREENING Q2Y  02/03/1998  Pneumococcal 65+ Low/Medium Risk (2 of 2 - PPSV23) 11/25/2013  MEDICARE YEARLY EXAM  08/05/2016  INFLUENZA AGE 9 TO ADULT  08/01/2017  
 OSTEOPOROSIS SCREENING (DEXA)  Completed  ZOSTER VACCINE AGE 60>  Addressed Vaccine Information Statement Influenza (Flu) Vaccine (Inactivated or Recombinant): What you need to know Many Vaccine Information Statements are available in Swedish and other languages. See www.immunize.org/vis Hojas de Información Sobre Vacunas están disponibles en Español y en muchos otros idiomas. Visite www.immunize.org/vis 1. Why get vaccinated? Influenza (flu) is a contagious disease that spreads around the [place] every year, usually between October and May. Flu is caused by influenza viruses, and is spread mainly by coughing, sneezing, and close contact. Anyone can get flu. Flu strikes suddenly and can last several days. Symptoms vary by age, but can include: 
 fever/chills  sore throat  muscle aches  fatigue  cough  headache  runny or stuffy nose Flu can also lead to pneumonia and blood infections, and cause diarrhea and seizures in children. If you have a medical condition, such as heart or lung disease, flu can make it worse. Flu is more dangerous for some people. Infants and young children, people 72years of age and older, pregnant women, and people with certain health conditions or a weakened immune system are at greatest risk. Each year thousands of people in the [facility] die from flu, and many more are hospitalized. Flu vaccine can: 
 keep you from getting flu, 
 make flu less severe if you do get it, and 
 keep you from spreading flu to your family and other people. 2. Inactivated and recombinant flu vaccines A dose of flu vaccine is recommended every flu season. Children 6 months through 6years of age may need two doses during the same flu season. Everyone else needs only one dose each flu season. Some inactivated flu vaccines contain a very small amount of a mercury-based preservative called thimerosal. Studies have not shown thimerosal in vaccines to be harmful, but flu vaccines that do not contain thimerosal are available. There is no live flu virus in flu shots. They cannot cause the flu. There are many flu viruses, and they are always changing. Each year a new flu vaccine is made to protect against three or four viruses that are likely to cause disease in the upcoming flu season. But even when the vaccine doesnt exactly match these viruses, it may still provide some protection Flu vaccine cannot prevent: 
 flu that is caused by a virus not covered by the vaccine, or 
 illnesses that look like flu but are not. It takes about 2 weeks for protection to develop after vaccination, and protection lasts through the flu season. 3. Some people should not get this vaccine Tell the person who is giving you the vaccine:  If you have any severe, life-threatening allergies. If you ever had a life-threatening allergic reaction after a dose of flu vaccine, or have a severe allergy to any part of this vaccine, you may be advised not to get vaccinated. Most, but not all, types of flu vaccine contain a small amount of egg protein.  If you ever had Guillain-Barré Syndrome (also called GBS). Some people with a history of GBS should not get this vaccine. This should be discussed with your doctor.  If you are not feeling well. It is usually okay to get flu vaccine when you have a mild illness, but you might be asked to come back when you feel better. 4. Risks of a vaccine reaction With any medicine, including vaccines, there is a chance of reactions. These are usually mild and go away on their own, but serious reactions are also possible. Most people who get a flu shot do not have any problems with it. Minor problems following a flu shot include:  
 soreness, redness, or swelling where the shot was given  hoarseness  sore, red or itchy eyes  cough  fever  aches  headache  itching  fatigue If these problems occur, they usually begin soon after the shot and last 1 or 2 days. More serious problems following a flu shot can include the following:  There may be a small increased risk of Guillain-Barré Syndrome (GBS) after inactivated flu vaccine. This risk has been estimated at 1 or 2 additional cases per million people vaccinated. This is much lower than the risk of severe complications from flu, which can be prevented by flu vaccine.  Young children who get the flu shot along with pneumococcal vaccine (PCV13) and/or DTaP vaccine at the same time might be slightly more likely to have a seizure caused by fever. Ask your doctor for more information. Tell your doctor if a child who is getting flu vaccine has ever had a seizure. Problems that could happen after any injected vaccine:  People sometimes faint after a medical procedure, including vaccination. Sitting or lying down for about 15 minutes can help prevent fainting, and injuries caused by a fall. Tell your doctor if you feel dizzy, or have vision changes or ringing in the ears.  Some people get severe pain in the shoulder and have difficulty moving the arm where a shot was given. This happens very rarely.  Any medication can cause a severe allergic reaction. Such reactions from a vaccine are very rare, estimated at about 1 in a million doses, and would happen within a few minutes to a few hours after the vaccination. As with any medicine, there is a very remote chance of a vaccine causing a serious injury or death. The safety of vaccines is always being monitored. For more information, visit: www.cdc.gov/vaccinesafety/ 
 
 
6. The National Vaccine Injury [facility] [place] 
 
 The [employer] Injury Compensation Program (VICP) is a federal program that was created to compensate people who may have been injured by certain vaccines. Persons who believe they may have been injured by a vaccine can learn about the program and about filing a claim by calling 7-134.975.8790 or visiting the [employer] website at www.[facility].gov/vaccinecompensation. There is a time limit to file a claim for compensation. 7. How can I learn more?  Ask your healthcare provider. He or she can give you the vaccine package insert or suggest other sources of information.  Call your local or state health department.  Contact the Centers for Disease Control and Prevention (CDC): 
- Call 4-916.923.4359 (1-800-CDC-INFO) or 
- Visit CDCs website at www.cdc.gov/flu Vaccine Information Statement Inactivated Influenza Vaccine 8/7/2015 
42 U. Karen Oppenheim [id number] Department of [facility] and [place] Centers for Disease Control and Prevention Office Use Only Vaccine Information Statement Pneumococcal Conjugate Vaccine (PCV13): What You Need to Know Many Vaccine Information Statements are available in [place]an and other languages. See www.immunize.org/vis. Hojas de información Sobre Vacunas están disponibles en español y en muchos otros idiomas. Visite www.immunize.org/vis. 1. Why get vaccinated? Vaccination can protect both children and adults from pneumococcal disease. Pneumococcal disease is caused by bacteria that can spread from person to person through close contact. It can cause ear infections, and it can also lead to more serious infections of the: 
 Lungs (pneumonia),  Blood (bacteremia), and 
 Covering of the brain and spinal cord (meningitis). Pneumococcal pneumonia is most common among adults. Pneumococcal meningitis can cause deafness and brain damage, and it kills about 1 child in 10 who get it.  
 
Anyone can get pneumococcal disease, but children under 3years of age and adults 72 years and older, people with certain medical conditions, and cigarette smokers are at the highest risk. Before there was a vaccine, the [facility] saw: 
 more than 700 cases of meningitis, 
 about 13,000 blood infections, 
 about 5 million ear infections, and 
 about 200 deaths 
in children under 5 each year from pneumococcal disease. Since vaccine became available, severe pneumococcal disease in these children has fallen by 88%. About 18,000 older adults die of pneumococcal disease each year in the [place]. Treatment of pneumococcal infections with penicillin and other drugs is not as effective as it used to be, because some strains of the disease have become resistant to these drugs. This makes prevention of the disease, through vaccination, even more important. 2. PCV13 vaccine Pneumococcal conjugate vaccine (called PCV13) protects against 13 types of pneumococcal bacteria. PCV13 is routinely given to children at 2, 4, 6, and 1515 months of age. It is also recommended for children and adults 3to 59years of age with certain health conditions, and for all adults 72years of age and older. Your doctor can give you details. 3. Some people should not get this vaccine Anyone who has ever had a life-threatening allergic reaction to a dose of this vaccine, to an earlier pneumococcal vaccine called PCV7, or to any vaccine containing diphtheria toxoid (for example, DTaP), should not get PCV13. Anyone with a severe allergy to any component of PCV13 should not get the vaccine. Tell your doctor if the person being vaccinated has any severe allergies. If the person scheduled for vaccination is not feeling well, your healthcare provider might decide to reschedule the shot on another day. 4. Risks of a vaccine reaction With any medicine, including vaccines, there is a chance of reactions.  These are usually mild and go away on their own, but serious reactions are also possible. Problems reported following PCV13 varied by age and dose in the series. The most common problems reported among children were:  About half became drowsy after the shot, had a temporary loss of appetite, or had redness or tenderness where the shot was given.  About 1 out of 3 had swelling where the shot was given.  About 1 out of 3 had a mild fever, and about 1 in 20 had a fever over 102.2°F. 
 Up to about 8 out of 10 became fussy or irritable. Adults have reported pain, redness, and swelling where the shot was given; also mild fever, fatigue, headache, chills, or muscle pain. Francy Lord children who get PCV13 along with inactivated flu vaccine at the same time may be at increased risk for seizures caused by fever. Ask your doctor for more information. Problems that could happen after any vaccine:  People sometimes faint after a medical procedure, including vaccination. Sitting or lying down for about 15 minutes can help prevent fainting, and injuries caused by a fall. Tell your doctor if you feel dizzy, or have vision changes or ringing in the ears.  Some older children and adults get severe pain in the shoulder and have difficulty moving the arm where a shot was given. This happens very rarely.  Any medication can cause a severe allergic reaction. Such reactions from a vaccine are very rare, estimated at about 1 in a million doses, and would happen within a few minutes to a few hours after the vaccination. As with any medicine, there is a very small chance of a vaccine causing a serious injury or death. The safety of vaccines is always being monitored. For more information, visit: www.cdc.gov/vaccinesafety/  
 
5. What if there is a serious reaction? What should I look for?  Look for anything that concerns you, such as signs of a severe allergic reaction, very high fever, or unusual behavior. Signs of a severe allergic reaction can include hives, swelling of the face and throat, difficulty breathing, a fast heartbeat, dizziness, and weakness  usually within a few minutes to a few hours after the vaccination. What should I do?  If you think it is a severe allergic reaction or other emergency that cant wait, call 9-1-1 or get the person to the nearest hospital. Otherwise, call your doctor. Reactions should be reported to the Vaccine Adverse Event Reporting System (VAERS). Your doctor should file this report, or you can do it yourself through the VAERS web site at www.vaers. [facility].gov, or by calling 5-622.924.7222. VAERS does not give medical advice. 6. The National Vaccine Injury Compensation Program 
 
The [facility] Vaccine Injury Compensation Program (VICP) is a federal program that was created to compensate people who may have been injured by certain vaccines. Persons who believe they may have been injured by a vaccine can learn about the program and about filing a claim by calling 6-338.907.6948 or visiting the [employer] [address] website at www.[facility].gov/vaccinecompensation. There is a time limit to file a claim for compensation. 7. How can I learn more?  Ask your healthcare provider. He or she can give you the vaccine package insert or suggest other sources of information.  Call your local or state health department.  Contact the Centers for Disease Control and Prevention (CDC): 
- Call 3-585.588.6738 (1-800-CDC-INFO) or 
- Visit CDCs website at www.cdc.gov/vaccines Vaccine Information Statement PCV13 Vaccine 11/5/2015  
42 ROMIE Keenan [id number] Department of [facility] and [place] Centers for Disease Control and Prevention Office Use Only Please provide this summary of care documentation to your next provider. Your primary care clinician is listed as Nathalia Stringer. If you have any questions after today's visit, please call [phone].

## 2017-10-16 NOTE — PROGRESS NOTES
HISTORY OF PRESENT ILLNESS  Lauren Hodges is a 80 y.o. female here for follow up. Reports insomnia everyday. days. No sundowning. ativan is not working  memory is slightly diminished. but functioning well. on namenda. Has HTN,on meds. no need for refill. Need lab work. No chest pain,or palpitation. Here for medicare wellness visit too. Need vaccine. Hypertension    Pertinent negatives include no chest pain, no palpitations, no blurred vision and no nausea. Dementia    Her past medical history is significant for hypertension. Cholesterol Problem   Pertinent negatives include no chest pain. Insomnia   Pertinent negatives include no chest pain. Follow-up   Pertinent negatives include no chest pain. Review of Systems   Constitutional: Negative. Negative for chills and fever. HENT: Negative. Eyes: Negative. Negative for blurred vision and double vision. Respiratory: Negative. Negative for cough and hemoptysis. Cardiovascular: Negative. Negative for chest pain and palpitations. Gastrointestinal: Negative. Negative for heartburn and nausea. Genitourinary: Negative. Skin: Negative. Neurological: Negative. Psychiatric/Behavioral: The patient has insomnia. Physical Exam   Constitutional: She appears well-developed and well-nourished. No distress. Neck: Normal range of motion. Neck supple. No JVD present. No thyromegaly present. Cardiovascular: Normal rate, regular rhythm, normal heart sounds and intact distal pulses. Pulmonary/Chest: Effort normal and breath sounds normal. No respiratory distress. She has no wheezes. Musculoskeletal: She exhibits no edema or tenderness. Psychiatric: She has a normal mood and affect. Her behavior is normal.       ASSESSMENT and PLAN  Diagnoses and all orders for this visit:    1. 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 on med. 2. Essential hypertension  On med. will do,  -     METABOLIC PANEL, COMPREHENSIVE  -     CBC W/O DIFF    3. Hypercholesterolemia  Will check,  -     METABOLIC PANEL, COMPREHENSIVE  -     LIPID PANEL    4. Insomnia, unspecified type  Will D/C ativan. Will give doxepin 10 mg HS. 5. Osteoarthritis of knee, unspecified laterality, unspecified osteoarthritis type    Stable. 6. Encounter for immunization    Will give,  -     PNEUMOCOCCAL CONJ VACCINE 13 VALENT IM  -     INFLUENZA VIRUS VACCINE, HIGH DOSE SEASONAL, PRESERVATIVE FREE    7. Screening for glaucoma  -     REFERRAL TO OPHTHALMOLOGY  8.medicare wellness vuisit subsequent          Discussed expected course/resolution/complications of diagnosis in detail with patient. Medication risks/benefits/costs/interactions/alternatives discussed with patient. Pt was given an after visit summary which includes diagnoses, current medications & vitals. Pt expressed understanding with the diagnosis and plan.

## 2017-10-17 ENCOUNTER — TELEPHONE (OUTPATIENT)
Dept: INTERNAL MEDICINE CLINIC | Age: 82
End: 2017-10-17

## 2017-10-17 LAB
ALBUMIN SERPL-MCNC: 4.1 G/DL (ref 3.5–4.7)
ALBUMIN/GLOB SERPL: 1.3 {RATIO} (ref 1.2–2.2)
ALP SERPL-CCNC: 89 IU/L (ref 39–117)
ALT SERPL-CCNC: 10 IU/L (ref 0–32)
AST SERPL-CCNC: 18 IU/L (ref 0–40)
BILIRUB SERPL-MCNC: 0.4 MG/DL (ref 0–1.2)
BUN SERPL-MCNC: 10 MG/DL (ref 8–27)
BUN/CREAT SERPL: 8 (ref 12–28)
CALCIUM SERPL-MCNC: 9.2 MG/DL (ref 8.7–10.3)
CHLORIDE SERPL-SCNC: 102 MMOL/L (ref 96–106)
CHOLEST SERPL-MCNC: 184 MG/DL (ref 100–199)
CO2 SERPL-SCNC: 25 MMOL/L (ref 18–29)
CREAT SERPL-MCNC: 1.26 MG/DL (ref 0.57–1)
ERYTHROCYTE [DISTWIDTH] IN BLOOD BY AUTOMATED COUNT: 15.4 % (ref 12.3–15.4)
GLOBULIN SER CALC-MCNC: 3.1 G/DL (ref 1.5–4.5)
GLUCOSE SERPL-MCNC: 104 MG/DL (ref 65–99)
HCT VFR BLD AUTO: 39 % (ref 34–46.6)
HDLC SERPL-MCNC: 49 MG/DL
HGB BLD-MCNC: 13 G/DL (ref 11.1–15.9)
INTERPRETATION, 910389: NORMAL
INTERPRETATION: NORMAL
LDLC SERPL CALC-MCNC: 120 MG/DL (ref 0–99)
MCH RBC QN AUTO: 22.9 PG (ref 26.6–33)
MCHC RBC AUTO-ENTMCNC: 33.3 G/DL (ref 31.5–35.7)
MCV RBC AUTO: 69 FL (ref 79–97)
PDF IMAGE, 910387: NORMAL
PLATELET # BLD AUTO: 269 X10E3/UL (ref 150–379)
POTASSIUM SERPL-SCNC: 4.7 MMOL/L (ref 3.5–5.2)
PROT SERPL-MCNC: 7.2 G/DL (ref 6–8.5)
RBC # BLD AUTO: 5.67 X10E6/UL (ref 3.77–5.28)
SODIUM SERPL-SCNC: 142 MMOL/L (ref 134–144)
TRIGL SERPL-MCNC: 75 MG/DL (ref 0–149)
VLDLC SERPL CALC-MCNC: 15 MG/DL (ref 5–40)
WBC # BLD AUTO: 4.9 X10E3/UL (ref 3.4–10.8)

## 2017-10-26 RX ORDER — RIVASTIGMINE 13.3 MG/24H
PATCH, EXTENDED RELEASE TRANSDERMAL
Qty: 30 PATCH | Refills: 0 | Status: SHIPPED | OUTPATIENT
Start: 2017-10-26 | End: 2017-11-22 | Stop reason: SDUPTHER

## 2017-11-22 RX ORDER — RIVASTIGMINE 13.3 MG/24H
PATCH, EXTENDED RELEASE TRANSDERMAL
Qty: 30 PATCH | Refills: 0 | Status: SHIPPED | OUTPATIENT
Start: 2017-11-22 | End: 2017-12-21 | Stop reason: SDUPTHER

## 2017-12-18 RX ORDER — AMLODIPINE AND BENAZEPRIL HYDROCHLORIDE 5; 20 MG/1; MG/1
CAPSULE ORAL
Qty: 90 CAP | Refills: 0 | Status: SHIPPED | OUTPATIENT
Start: 2017-12-18 | End: 2018-03-15 | Stop reason: SDUPTHER

## 2017-12-21 RX ORDER — RIVASTIGMINE 13.3 MG/24H
PATCH, EXTENDED RELEASE TRANSDERMAL
Qty: 30 PATCH | Refills: 0 | Status: SHIPPED | OUTPATIENT
Start: 2017-12-21 | End: 2018-01-18 | Stop reason: SDUPTHER

## 2017-12-22 DIAGNOSIS — F03.90 DEMENTIA WITHOUT BEHAVIORAL DISTURBANCE, UNSPECIFIED DEMENTIA TYPE: ICD-10-CM

## 2017-12-22 RX ORDER — MEMANTINE HYDROCHLORIDE 28 MG/1
CAPSULE, EXTENDED RELEASE ORAL
Qty: 90 CAP | Refills: 0 | Status: SHIPPED | OUTPATIENT
Start: 2017-12-22 | End: 2018-03-25 | Stop reason: SDUPTHER

## 2018-01-18 RX ORDER — RIVASTIGMINE 13.3 MG/24H
PATCH, EXTENDED RELEASE TRANSDERMAL
Qty: 30 PATCH | Refills: 0 | Status: SHIPPED | OUTPATIENT
Start: 2018-01-18 | End: 2018-02-16 | Stop reason: SDUPTHER

## 2018-01-29 RX ORDER — ALENDRONATE SODIUM 70 MG/1
TABLET ORAL
Qty: 12 TAB | Refills: 0 | Status: SHIPPED | OUTPATIENT
Start: 2018-01-29 | End: 2018-04-28 | Stop reason: SDUPTHER

## 2018-02-16 RX ORDER — RIVASTIGMINE 13.3 MG/24H
PATCH, EXTENDED RELEASE TRANSDERMAL
Qty: 30 PATCH | Refills: 0 | Status: SHIPPED | OUTPATIENT
Start: 2018-02-16 | End: 2018-03-17 | Stop reason: SDUPTHER

## 2018-03-15 RX ORDER — AMLODIPINE AND BENAZEPRIL HYDROCHLORIDE 5; 20 MG/1; MG/1
CAPSULE ORAL
Qty: 90 CAP | Refills: 0 | Status: SHIPPED | COMMUNITY
Start: 2018-03-15 | End: 2018-06-12 | Stop reason: SDUPTHER

## 2018-03-19 RX ORDER — RIVASTIGMINE 13.3 MG/24H
PATCH, EXTENDED RELEASE TRANSDERMAL
Qty: 30 PATCH | Refills: 0 | Status: SHIPPED | OUTPATIENT
Start: 2018-03-19 | End: 2018-04-17 | Stop reason: SDUPTHER

## 2018-03-25 DIAGNOSIS — F03.90 DEMENTIA WITHOUT BEHAVIORAL DISTURBANCE, UNSPECIFIED DEMENTIA TYPE: ICD-10-CM

## 2018-03-26 RX ORDER — MEMANTINE HYDROCHLORIDE 28 MG/1
CAPSULE, EXTENDED RELEASE ORAL
Qty: 90 CAP | Refills: 0 | Status: SHIPPED | OUTPATIENT
Start: 2018-03-26 | End: 2018-06-26 | Stop reason: SDUPTHER

## 2018-04-17 RX ORDER — RIVASTIGMINE 13.3 MG/24H
PATCH, EXTENDED RELEASE TRANSDERMAL
Qty: 30 PATCH | Refills: 0 | Status: SHIPPED | OUTPATIENT
Start: 2018-04-17 | End: 2018-05-18 | Stop reason: SDUPTHER

## 2018-04-30 RX ORDER — ALENDRONATE SODIUM 70 MG/1
TABLET ORAL
Qty: 12 TAB | Refills: 0 | Status: SHIPPED | OUTPATIENT
Start: 2018-04-30 | End: 2018-07-28 | Stop reason: SDUPTHER

## 2018-05-18 RX ORDER — RIVASTIGMINE 13.3 MG/24H
PATCH, EXTENDED RELEASE TRANSDERMAL
Qty: 30 PATCH | Refills: 0 | Status: SHIPPED | OUTPATIENT
Start: 2018-05-18 | End: 2018-07-01 | Stop reason: SDUPTHER

## 2018-06-12 RX ORDER — AMLODIPINE AND BENAZEPRIL HYDROCHLORIDE 5; 20 MG/1; MG/1
CAPSULE ORAL
Qty: 90 CAP | Refills: 0 | Status: SHIPPED | OUTPATIENT
Start: 2018-06-12 | End: 2018-09-12 | Stop reason: SDUPTHER

## 2018-06-25 RX ORDER — RIVASTIGMINE 13.3 MG/24H
PATCH, EXTENDED RELEASE TRANSDERMAL
Qty: 30 PATCH | Refills: 0 | OUTPATIENT
Start: 2018-06-25

## 2018-06-26 DIAGNOSIS — F03.90 DEMENTIA WITHOUT BEHAVIORAL DISTURBANCE, UNSPECIFIED DEMENTIA TYPE: ICD-10-CM

## 2018-06-26 RX ORDER — MEMANTINE HYDROCHLORIDE 28 MG/1
CAPSULE, EXTENDED RELEASE ORAL
Qty: 90 CAP | Refills: 0 | Status: SHIPPED | OUTPATIENT
Start: 2018-06-26 | End: 2018-10-16 | Stop reason: SDUPTHER

## 2018-07-02 RX ORDER — RIVASTIGMINE 13.3 MG/24H
PATCH, EXTENDED RELEASE TRANSDERMAL
Qty: 30 PATCH | Refills: 0 | Status: SHIPPED | OUTPATIENT
Start: 2018-07-02 | End: 2018-07-31 | Stop reason: SDUPTHER

## 2018-07-30 RX ORDER — ALENDRONATE SODIUM 70 MG/1
TABLET ORAL
Qty: 12 TAB | Refills: 0 | Status: SHIPPED | OUTPATIENT
Start: 2018-07-30 | End: 2018-10-16 | Stop reason: SDUPTHER

## 2018-07-31 RX ORDER — RIVASTIGMINE 13.3 MG/24H
PATCH, EXTENDED RELEASE TRANSDERMAL
Qty: 30 PATCH | Refills: 0 | Status: SHIPPED | OUTPATIENT
Start: 2018-07-31 | End: 2018-10-16 | Stop reason: SDUPTHER

## 2018-10-16 ENCOUNTER — HOSPITAL ENCOUNTER (OUTPATIENT)
Dept: LAB | Age: 83
Discharge: HOME | End: 2018-10-16
Payer: MEDICARE

## 2018-10-16 ENCOUNTER — OFFICE VISIT (OUTPATIENT)
Dept: INTERNAL MEDICINE CLINIC | Age: 83
End: 2018-10-16

## 2018-10-16 VITALS
WEIGHT: 142 LBS | DIASTOLIC BLOOD PRESSURE: 84 MMHG | SYSTOLIC BLOOD PRESSURE: 118 MMHG | BODY MASS INDEX: 25.16 KG/M2 | HEIGHT: 63 IN | OXYGEN SATURATION: 100 % | RESPIRATION RATE: 20 BRPM | TEMPERATURE: 98 F | HEART RATE: 66 BPM

## 2018-10-16 DIAGNOSIS — F03.90 DEMENTIA WITHOUT BEHAVIORAL DISTURBANCE, UNSPECIFIED DEMENTIA TYPE: Primary | ICD-10-CM

## 2018-10-16 DIAGNOSIS — Z71.89 ACP (ADVANCE CARE PLANNING): ICD-10-CM

## 2018-10-16 DIAGNOSIS — L25.9 CONTACT DERMATITIS, UNSPECIFIED CONTACT DERMATITIS TYPE, UNSPECIFIED TRIGGER: ICD-10-CM

## 2018-10-16 DIAGNOSIS — Z13.21 ENCOUNTER FOR VITAMIN DEFICIENCY SCREENING: ICD-10-CM

## 2018-10-16 DIAGNOSIS — E66.3 OVERWEIGHT: ICD-10-CM

## 2018-10-16 DIAGNOSIS — I10 ESSENTIAL HYPERTENSION: ICD-10-CM

## 2018-10-16 DIAGNOSIS — Z23 ENCOUNTER FOR IMMUNIZATION: ICD-10-CM

## 2018-10-16 DIAGNOSIS — Z00.00 MEDICARE ANNUAL WELLNESS VISIT, SUBSEQUENT: ICD-10-CM

## 2018-10-16 PROCEDURE — 80053 COMPREHEN METABOLIC PANEL: CPT

## 2018-10-16 PROCEDURE — 80061 LIPID PANEL: CPT

## 2018-10-16 PROCEDURE — 85027 COMPLETE CBC AUTOMATED: CPT

## 2018-10-16 PROCEDURE — 82306 VITAMIN D 25 HYDROXY: CPT

## 2018-10-16 PROCEDURE — 36415 COLL VENOUS BLD VENIPUNCTURE: CPT

## 2018-10-16 RX ORDER — RIVASTIGMINE 13.3 MG/24H
1 PATCH, EXTENDED RELEASE TRANSDERMAL DAILY
Qty: 90 PATCH | Refills: 1 | Status: SHIPPED | OUTPATIENT
Start: 2018-10-16 | End: 2019-03-02 | Stop reason: SDUPTHER

## 2018-10-16 RX ORDER — AMLODIPINE AND BENAZEPRIL HYDROCHLORIDE 5; 20 MG/1; MG/1
1 CAPSULE ORAL DAILY
Qty: 90 CAP | Refills: 1 | Status: SHIPPED | OUTPATIENT
Start: 2018-10-16 | End: 2019-03-02 | Stop reason: SDUPTHER

## 2018-10-16 RX ORDER — TRIAMCINOLONE ACETONIDE 5 MG/G
OINTMENT TOPICAL 2 TIMES DAILY
Qty: 30 G | Refills: 0 | Status: SHIPPED | OUTPATIENT
Start: 2018-10-16 | End: 2021-11-26 | Stop reason: ALTCHOICE

## 2018-10-16 RX ORDER — ALENDRONATE SODIUM 70 MG/1
TABLET ORAL
Qty: 24 TAB | Refills: 0 | Status: SHIPPED | OUTPATIENT
Start: 2018-10-16 | End: 2018-10-16 | Stop reason: SDUPTHER

## 2018-10-16 RX ORDER — ALENDRONATE SODIUM 70 MG/1
TABLET ORAL
Qty: 28 TAB | Refills: 11 | Status: SHIPPED | OUTPATIENT
Start: 2018-10-16 | End: 2019-10-14 | Stop reason: SDUPTHER

## 2018-10-16 RX ORDER — MEMANTINE HYDROCHLORIDE 28 MG/1
CAPSULE, EXTENDED RELEASE ORAL
Qty: 90 CAP | Refills: 1 | Status: SHIPPED | OUTPATIENT
Start: 2018-10-16 | End: 2019-03-02 | Stop reason: SDUPTHER

## 2018-10-16 NOTE — ACP (ADVANCE CARE PLANNING)
Advance Care Planning (ACP) Provider Conversation Snapshot    Date of ACP Conversation: 10/16/18  Persons included in Conversation:  patient and family  Length of ACP Conversation in minutes:  16 minutes    Authorized Decision Maker (if patient is incapable of making informed decisions):    This person is:   Healthcare Agent/Medical Power of  under Advance Directive          Conversation Outcomes / Follow-Up Plan:   Reviewed existing Advance Directive

## 2018-10-16 NOTE — PROGRESS NOTES
Subjective: Chief Complaint Patient presents with  Medication Refill  Dementia  Cholesterol Problem  Hypertension History of Present Illness Jillian Del Valle is a 80y.o. year old female who is a patient of Dr. Gilford Ables that presents today with her son for follow-up. Her PMH includes dementia, high cholesterol, HTN, and osteoporosis. She reports feeling generally well at time of visit. No new complaints. BP stable on meds. She is on meds for dementia. She needs labs and refills. Her son states she has a rash to the left side of her neck. Unknown onset. Patient denies pruritis. NAD. No CP, SOB, GI, or  symptoms. Reviewed medications, recent lab work and imaging with patient. Pt reports compliance with medications. Needs flu vaccine. Current Outpatient Prescriptions on File Prior to Visit Medication Sig Dispense Refill  meloxicam (MOBIC) 7.5 mg tablet TAKE 1 TABLET BY MOUTH DAILY 90 Tab 5  
 doxepin (SINEQUAN) 10 mg capsule TAKE ONE CAPSULE BY MOUTH EVERY EVENING 90 Cap 2  
 LORazepam (ATIVAN) 0.5 mg tablet Take 1 Tab by mouth nightly as needed for Anxiety. Max Daily Amount: 0.5 mg. 30 Tab 0  
 CALCIUM 500 WITH D 500 mg(1,250mg) -400 unit tab TAKE 1 TABLET BY MOUTH EVERY DAY 30 Tab 0  
 acetaminophen (TYLENOL ARTHRITIS PAIN) 650 mg CR tablet Take 1 Tab by mouth two (2) times daily as needed for Pain. 60 Tab 3 No current facility-administered medications on file prior to visit. Allergies Allergen Reactions  Pcn [Penicillins] Itching Past Medical History:  
Diagnosis Date  Arthritis   
 back and knees  Dementia 2/4/2013  
 mild  Hypercholesterolemia  Hypertension  Other ill-defined conditions(799.89)   
 elevated cholesterol  PUD (peptic ulcer disease)  Stroke ([facility] [address]   
 tia  TIA (transient ischaemic attack) Past Surgical History:  
Procedure Laterality Date  CARDIAC CATHETERIZATION  10/1990  
 normal  
  COLONOSCOPY  [id number]  
 diverticuli, hemorrhoids  EGD    
 x 3  
 EGD  [id number]  
 gastritis, duodenitis  HX CATARACT REMOVAL    
 bilateral  
 HX GYN    
 BTL  
 HX HEENT    
 tonsillectomy  HX ORTHOPAEDIC  2/2011  
 right knee replacement- Dr. Bev Logan  HX ORTHOPAEDIC  12/10/12 LEFT TOTAL KNEE ARTHROPLASTY  WITH NAVIGATION (EPI W SPINAL ANES).  HX OTHER SURGICAL  02/2002  
 removal of meningioma  HX OTHER SURGICAL    
 colonoscopy/EGD  NEUROLOGICAL PROCEDURE UNLISTED    
 brain tumor resection yrs back Social History Substance Use Topics  Smoking status: Never Smoker  Smokeless tobacco: Never Used  Alcohol use No  
  
Family History Problem Relation Age of Onset  Hypertension Mother  Elevated Lipids Mother  Dementia Mother  Elevated Lipids Sister  Dementia Sister  Cancer Brother   
  colon  No Known Problems Child  No Known Problems Child  No Known Problems Child  No Known Problems Child Objective:  
 
Vitals:  
 10/16/18 0912 BP: 118/84 Pulse: 66 Resp: 20 Temp: 98 °F (36.7 °C) TempSrc: Oral  
SpO2: 100% Weight: 142 lb (64.4 kg) Height: 5' 3\" (1.6 m) Review of Systems Constitutional: Negative. HENT: Negative. Eyes: Negative. Respiratory: Negative. Cardiovascular: Negative. Gastrointestinal: Negative. Genitourinary: Negative. Musculoskeletal: Negative. Skin: Positive for rash. Neurological: Negative. Psychiatric/Behavioral: Negative. Physical Exam  
Constitutional: She appears well-developed and well-nourished. No distress. Well-appearing elderly AA female. NAD HENT:  
Head: Normocephalic and atraumatic. Eyes: Conjunctivae and EOM are normal. Pupils are equal, round, and reactive to light. Neck: Normal range of motion. Neck supple. Cardiovascular: Normal rate, regular rhythm and normal heart sounds. Pulmonary/Chest: Effort normal and breath sounds normal. No respiratory distress. She has no wheezes. Abdominal: She exhibits no distension. Musculoskeletal: Normal range of motion. She exhibits no edema, tenderness or deformity. Neurological: She is alert. Pleasantly confused. Skin: Skin is warm and dry. Rash (Dry scaly area to left side of neck.  ) noted. She is not diaphoretic. There is erythema. No pallor. Psychiatric: She has a normal mood and affect. Her behavior is normal.  
Nursing note and vitals reviewed. Assessment/ Plan:  
Diagnoses and all orders for this visit: 1. Dementia without behavioral disturbance, unspecified dementia type Will order 
-     memantine ER (NAMENDA XR) 28 mg capsule; TAKE ONE CAPSULE BY MOUTH EVERY DAY 
-     rivastigmine (EXELON) 13.3 mg/24 hour patch; 1 Patch by TransDERmal route daily. 2. Essential hypertension Will order 
-     amLODIPine-benazepril (LOTREL) 5-20 mg per capsule; Take 1 Cap by mouth daily. Need OV for further refills 
-     CBC W/O DIFF 
-     METABOLIC PANEL, COMPREHENSIVE 
-     LIPID PANEL 
-     VITAMIN D, 25 HYDROXY 3. Contact dermatitis, unspecified contact dermatitis type, unspecified trigger Will order 
-     triamcinolone acetonide (KENALOG) 0.5 % ointment; Apply  to affected area two (2) times a day. use thin layer 4. Encounter for immunization Will order -     Influenza Vaccine Inactivated (IIV)(FLUAD), Subunit, Adjuvanted, IM, (82567) -     Administration fee () for Medicare insured patients 5. Overweight Addressed weight, diet and exercise with patient. Decrease carbohydrates (white foods, sweet foods, sweet drinks and alcohol), increase green leafy vegetables and protein (lean meats and beans) with each meal. Avoid fried foods. Eat 3-5 small meals daily. Do not skip meals. Increase water intake.  Increase physical activity to 30 minutes daily for health benefit or 60 minutes daily to prevent weight regain, as tolerated. Get 7-8 hours uninterrupted sleep nightly. 6. Medicare annual wellness visit, subsequent 7. ACP (advance care planning) Other orders 
-     alendronate (FOSAMAX) 70 mg tablet; TAKE 1 TABLET BY MOUTH EVERY SUNDAY Patient's plan of care has been reviewed with them. Patient and/or family have verbally conveyed their understanding and agreement of the patient's signs, symptoms, diagnosis, treatment and prognosis and additionally agree to follow up as recommended or return to [facility] should their condition change prior to follow-up. Discharge instructions have also been provided to the patient with some educational information regarding their diagnosis as well a list of reasons why they would want to return to the office prior to their follow-up appointment should their condition change. Follow-up with Dr. Faye Arrieta in 6 months.

## 2018-10-16 NOTE — PROGRESS NOTES
Health Maintenance Due Topic Date Due  
 DTaP/Tdap/Td series (1 - Tdap) 02/03/1954  Shingrix Vaccine Age 50> (1 of 2) 02/03/1983  GLAUCOMA SCREENING Q2Y  02/03/1998  Influenza Age 5 to Adult  08/01/2018 Chief Complaint Patient presents with  Medication Refill  Dementia  Cholesterol Problem  Hypertension 1. Have you been to the ER, urgent care clinic since your last visit? Hospitalized since your last visit? No 
 
2. Have you seen or consulted any other health care providers outside of the [address] [place] [address] since your last visit? Include any pap smears or colon screening. No 
 
3) Do you have an Advance Directive on file? no 
 
4) Are you interested in receiving information on Advance Directives? NO Patient is accompanied by self I have received verbal consent from El Regan to discuss any/all medical information while they are present in the room. El Regan is a 80 y.o. female  who presents for routine immunizations. She denies any symptoms , reactions or allergies that would exclude them from being immunized today. Risks and adverse reactions were discussed and the VIS was given to them. All questions were addressed. She was observed for 10 min post injection. There were no reactions observed.  
 
Dian Jackson LPN

## 2018-10-16 NOTE — MR AVS SNAPSHOT
[address] [facility] [address] [place] [address] 
[phone] Patient: Marah Mcgrath MRN:  [id number] Visit Information Date & Time Provider Department Dept. Phone Encounter #  
 10/16/2018  9:00 AM Anastacia Laguna, [facility] [phone] [id number] Upcoming Health Maintenance Date Due DTaP/Tdap/Td series (1 - Tdap) 2/3/1954 Shingrix Vaccine Age 50> (1 of 2) 2/3/1983 GLAUCOMA SCREENING Q2Y 2/3/1998 Influenza Age 5 to Adult 8/1/2018 MEDICARE YEARLY EXAM 10/17/2018 Allergies as of 10/16/2018  Review Complete On: 10/16/2017 By: Judie Gill MD  
  
 Severity Noted Reaction Type Reactions Pcn [Penicillins]  03/25/2010    Itching Current Immunizations  Reviewed on 10/16/2017 Name Date Influenza High Dose Vaccine PF 10/16/2017 Influenza Vaccine (Tri) Adjuvanted  Incomplete Influenza Vaccine Split 10/26/2012, 10/26/2011, 10/11/2010 Pneumococcal Conjugate (PCV-13) 10/16/2017 ZZZ-RETIRED (DO NOT USE) Pneumococcal Vaccine (Unspecified Type) 11/25/2008 Not reviewed this visit You Were Diagnosed With   
  
 Codes Comments Dementia without behavioral disturbance, unspecified dementia type    -  Primary ICD-10-CM: F03.90 ICD-9-CM: 294.20 Encounter for immunization     ICD-10-CM: P67 ICD-9-CM: V03.89 Contact dermatitis, unspecified contact dermatitis type, unspecified trigger     ICD-10-CM: L25.9 ICD-9-CM: 692.9 Essential hypertension     ICD-10-CM: I10 
ICD-9-CM: 401.9 Vitals BP Pulse Temp Resp Height(growth percentile) Weight(growth percentile) 118/84 (BP 1 Location: Left arm, BP Patient Position: Sitting) 66 98 °F (36.7 °C) (Oral) 20 5' 3\" (1.6 m) 142 lb (64.4 kg) SpO2 BMI OB Status Smoking Status 100% 25.15 kg/m2 Postmenopausal Never Smoker Vitals History BMI and BSA Data Body Mass Index Body Surface Area 25.15 kg/m 2 1.69 m 2 Preferred Pharmacy Pharmacy Name Phone [facility] [address]  [place] [phone] Your Updated Medication List  
  
   
This list is accurate as of 10/16/18  9:33 AM.  Always use your most recent med list.  
  
  
  
  
 acetaminophen 650 mg Tber Commonly known as:  TYLENOL ARTHRITIS PAIN Take 1 Tab by mouth two (2) times daily as needed for Pain. alendronate 70 mg tablet Commonly known as:  FOSAMAX TAKE 1 TABLET BY MOUTH EVERY   
  
 amLODIPine-benazepril 5-20 mg per capsule Commonly known as:  Arlin  Take 1 Cap by mouth daily. Need OV for further refills CALCIUM 500 WITH D 500 mg(1,250mg) -400 unit Tab Generic drug:  Calcium-Cholecalciferol (D3)  
TAKE 1 TABLET BY MOUTH EVERY DAY  
  
 doxepin 10 mg capsule Commonly known as:  SINEquan TAKE ONE CAPSULE BY MOUTH EVERY EVENING  
  
 LORazepam 0.5 mg tablet Commonly known as:  ATIVAN Take 1 Tab by mouth nightly as needed for Anxiety. Max Daily Amount: 0.5 mg.  
  
 meloxicam 7.5 mg tablet Commonly known as:  MOBIC  
TAKE 1 TABLET BY MOUTH DAILY  
  
 memantine ER 28 mg capsule Commonly known as:  NAMENDA XR  
TAKE ONE CAPSULE BY MOUTH EVERY DAY  
  
 rivastigmine 13.3 mg/24 hour patch Commonly known as:  EXELON  
1 Patch by TransDERmal route daily. triamcinolone acetonide 0.5 % ointment Commonly known as:  KENALOG Apply  to affected area two (2) times a day. use thin layer Prescriptions Sent to Pharmacy Refills  
 memantine ER (NAMENDA XR) 28 mg capsule 1 Sig: TAKE ONE CAPSULE BY MOUTH EVERY DAY Class: Normal  
 [facility] [address] [facility] AT [address] [place] [address] Road Ph #: [phone]  
 rivastigmine (EXELON) 13.3 mg/24 hour patch 1 Si Patch by TransDERmal route daily.   
 Class: Normal  
 Pharmacy: [facility] [address] Ph #: [phone] Route: TransDERmal  
 amLODIPine-benazepril (LOTREL) 5-20 mg per capsule 1 Sig: Take 1 Cap by mouth daily. Need OV for further refills Class: Normal  
 Pharmacy: [address] Ph #: [phone] Route: Oral  
 triamcinolone acetonide (KENALOG) 0.5 % ointment 0 Sig: Apply  to affected area two (2) times a day. use thin layer Class: Normal  
 Pharmacy: [address] Ph #: [phone] Route: Topical  
 alendronate (FOSAMAX) 70 mg tablet 0 Sig: TAKE 1 TABLET BY MOUTH EVERY SUNDAY Class: Normal  
 Pharmacy: [address] Ph #: [phone] We Performed the Following ADMIN INFLUENZA VIRUS VAC [ HCPCS] CBC W/O DIFF [60996 CPT(R)] INFLUENZA VACCINE INACTIVATED (IIV), SUBUNIT, ADJUVANTED, IM [id number] CPT(R)] LIPID PANEL [41536 CPT(R)] METABOLIC PANEL, COMPREHENSIVE [69471 CPT(R)] VITAMIN D, 25 HYDROXY [id number] CPT(R)] Patient Instructions Vaccine Information Statement Influenza (Flu) Vaccine (Inactivated or Recombinant): What you need to know Many Vaccine Information Statements are available in Persian and other languages. See www.immunize.org/vis Hojas de Información Sobre Vacunas están disponibles en Español y en muchos otros idiomas. Visite www.immunize.org/vis 1. Why get vaccinated? Influenza (flu) is a contagious disease that spreads around the [place] every year, usually between October and May. Flu is caused by influenza viruses, and is spread mainly by coughing, sneezing, and close contact. Anyone can get flu. Flu strikes suddenly and can last several days. Symptoms vary by age, but can include: 
 fever/chills  sore throat  muscle aches  fatigue  cough  headache  runny or stuffy nose Flu can also lead to pneumonia and blood infections, and cause diarrhea and seizures in children. If you have a medical condition, such as heart or lung disease, flu can make it worse. Flu is more dangerous for some people. Infants and young children, people 72years of age and older, pregnant women, and people with certain health conditions or a weakened immune system are at greatest risk. Each year thousands of people in the [facility] die from flu, and many more are hospitalized. Flu vaccine can: 
 keep you from getting flu, 
 make flu less severe if you do get it, and 
 keep you from spreading flu to your family and other people. 2. Inactivated and recombinant flu vaccines A dose of flu vaccine is recommended every flu season. Children 6 months through 6years of age may need two doses during the same flu season. Everyone else needs only one dose each flu season. Some inactivated flu vaccines contain a very small amount of a mercury-based preservative called thimerosal. Studies have not shown thimerosal in vaccines to be harmful, but flu vaccines that do not contain thimerosal are available. There is no live flu virus in flu shots. They cannot cause the flu. There are many flu viruses, and they are always changing. Each year a new flu vaccine is made to protect against three or four viruses that are likely to cause disease in the upcoming flu season. But even when the vaccine doesnt exactly match these viruses, it may still provide some protection Flu vaccine cannot prevent: 
 flu that is caused by a virus not covered by the vaccine, or 
 illnesses that look like flu but are not.  
 
It takes about 2 weeks for protection to develop after vaccination, and protection lasts through the flu season. 3. Some people should not get this vaccine Tell the person who is giving you the vaccine:  If you have any severe, life-threatening allergies. If you ever had a life-threatening allergic reaction after a dose of flu vaccine, or have a severe allergy to any part of this vaccine, you may be advised not to get vaccinated. Most, but not all, types of flu vaccine contain a small amount of egg protein.  If you ever had Guillain-Barré Syndrome (also called GBS). Some people with a history of GBS should not get this vaccine. This should be discussed with your doctor.  If you are not feeling well. It is usually okay to get flu vaccine when you have a mild illness, but you might be asked to come back when you feel better. 4. Risks of a vaccine reaction With any medicine, including vaccines, there is a chance of reactions. These are usually mild and go away on their own, but serious reactions are also possible. Most people who get a flu shot do not have any problems with it. Minor problems following a flu shot include:  
 soreness, redness, or swelling where the shot was given  hoarseness  sore, red or itchy eyes  cough  fever  aches  headache  itching  fatigue If these problems occur, they usually begin soon after the shot and last 1 or 2 days. More serious problems following a flu shot can include the following:  There may be a small increased risk of Guillain-Barré Syndrome (GBS) after inactivated flu vaccine. This risk has been estimated at 1 or 2 additional cases per million people vaccinated. This is much lower than the risk of severe complications from flu, which can be prevented by flu vaccine.    
 
 Young children who get the flu shot along with pneumococcal vaccine (PCV13) and/or DTaP vaccine at the same time might be slightly more likely to have a seizure caused by fever. Ask your doctor for more information. Tell your doctor if a child who is getting flu vaccine has ever had a seizure. Problems that could happen after any injected vaccine:  People sometimes faint after a medical procedure, including vaccination. Sitting or lying down for about 15 minutes can help prevent fainting, and injuries caused by a fall. Tell your doctor if you feel dizzy, or have vision changes or ringing in the ears.  Some people get severe pain in the shoulder and have difficulty moving the arm where a shot was given. This happens very rarely.  Any medication can cause a severe allergic reaction. Such reactions from a vaccine are very rare, estimated at about 1 in a million doses, and would happen within a few minutes to a few hours after the vaccination. As with any medicine, there is a very remote chance of a vaccine causing a serious injury or death. The safety of vaccines is always being monitored. For more information, visit: www.cdc.gov/vaccinesafety/ 
 
5. What if there is a serious reaction? What should I look for?  Look for anything that concerns you, such as signs of a severe allergic reaction, very high fever, or unusual behavior. Signs of a severe allergic reaction can include hives, swelling of the face and throat, difficulty breathing, a fast heartbeat, dizziness, and weakness  usually within a few minutes to a few hours after the vaccination. What should I do?  If you think it is a severe allergic reaction or other emergency that cant wait, call 9-1-1 and get the person to the nearest hospital. Otherwise, call your doctor.  Reactions should be reported to the Vaccine Adverse Event Reporting System (VAERS). Your doctor should file this report, or you can do it yourself through  the VAERS web site at www.vaers. [facility].gov, or by calling 9-303.535.4744. VAERS does not give medical advice. 6. The National Vaccine Injury Compensation Program 
 
The [facility] Vaccine Injury Compensation Program (VICP) is a federal program that was created to compensate people who may have been injured by certain vaccines. Persons who believe they may have been injured by a vaccine can learn about the program and about filing a claim by calling 8-214.250.8913 or visiting the [employer] website at www.[facility].gov/vaccinecompensation. There is a time limit to file a claim for compensation. 7. How can I learn more?  Ask your healthcare provider. He or she can give you the vaccine package insert or suggest other sources of information.  Call your local or state health department.  Contact the Centers for Disease Control and Prevention (CDC): 
- Call 9-381.125.9564 (1-800-CDC-INFO) or 
- Visit CDCs website at www.cdc.gov/flu Vaccine Information Statement Inactivated Influenza Vaccine 8/7/2015 
42 ROMIE Scott [id number] [facility] and [employer] Centers for Disease Control and Prevention Office Use Only Introducing [facility] SERVICES! Dear Viridiana Hess: Thank you for requesting a netTALK account. Our records indicate that you have previously registered for a netTALK account but its currently inactive. Please call our netTALK support line at [phone]. Additional Information If you have questions, please visit the Frequently Asked Questions section of the netTALK website at [email or web address] Remember, [employer] is NOT to be used for urgent needs. For medical emergencies, dial 911. Now available from your iPhone and Android! Please provide this summary of care documentation to your next provider. Your primary care clinician is listed as Lucero Tracy. If you have any questions after today's visit, please call [phone].

## 2018-10-16 NOTE — LETTER
NOTIFICATION RETURN TO WORK / SCHOOL 
 
10/16/2018 9:36 AM 
 
Ms. Bea Saavedra [phone] To Whom It May Concern: 
 
Bea Saavedra is currently under the care of [address] [place]. Her son/caretaker, Anita Poon  will return to work/school on: 10/16/2018 If there are questions or concerns please have the patient contact our office. Sincerely, Cornelius Mark NP

## 2018-10-16 NOTE — PATIENT INSTRUCTIONS
Vaccine Information Statement Influenza (Flu) Vaccine (Inactivated or Recombinant): What you need to know Many Vaccine Information Statements are available in [place]men and other languages. See www.immunize.org/vis Hojas de Información Sobre Vacunas están disponibles en Español y en muchos otros idiomas. Visite www.immunize.org/vis 1. Why get vaccinated? Influenza (flu) is a contagious disease that spreads around the [place] every year, usually between October and May. Flu is caused by influenza viruses, and is spread mainly by coughing, sneezing, and close contact. Anyone can get flu. Flu strikes suddenly and can last several days. Symptoms vary by age, but can include: 
 fever/chills  sore throat  muscle aches  fatigue  cough  headache  runny or stuffy nose Flu can also lead to pneumonia and blood infections, and cause diarrhea and seizures in children. If you have a medical condition, such as heart or lung disease, flu can make it worse. Flu is more dangerous for some people. Infants and young children, people 72years of age and older, pregnant women, and people with certain health conditions or a weakened immune system are at greatest risk. Each year thousands of people in the [facility] die from flu, and many more are hospitalized. Flu vaccine can: 
 keep you from getting flu, 
 make flu less severe if you do get it, and 
 keep you from spreading flu to your family and other people. 2. Inactivated and recombinant flu vaccines A dose of flu vaccine is recommended every flu season. Children 6 months through 6years of age may need two doses during the same flu season. Everyone else needs only one dose each flu season.   
 
 
Some inactivated flu vaccines contain a very small amount of a mercury-based preservative called thimerosal. Studies have not shown thimerosal in vaccines to be harmful, but flu vaccines that do not contain thimerosal are available. There is no live flu virus in flu shots. They cannot cause the flu. There are many flu viruses, and they are always changing. Each year a new flu vaccine is made to protect against three or four viruses that are likely to cause disease in the upcoming flu season. But even when the vaccine doesnt exactly match these viruses, it may still provide some protection Flu vaccine cannot prevent: 
 flu that is caused by a virus not covered by the vaccine, or 
 illnesses that look like flu but are not. It takes about 2 weeks for protection to develop after vaccination, and protection lasts through the flu season. 3. Some people should not get this vaccine Tell the person who is giving you the vaccine:  If you have any severe, life-threatening allergies. If you ever had a life-threatening allergic reaction after a dose of flu vaccine, or have a severe allergy to any part of this vaccine, you may be advised not to get vaccinated. Most, but not all, types of flu vaccine contain a small amount of egg protein.  If you ever had Guillain-Barré Syndrome (also called GBS). Some people with a history of GBS should not get this vaccine. This should be discussed with your doctor.  If you are not feeling well. It is usually okay to get flu vaccine when you have a mild illness, but you might be asked to come back when you feel better. 4. Risks of a vaccine reaction With any medicine, including vaccines, there is a chance of reactions. These are usually mild and go away on their own, but serious reactions are also possible. Most people who get a flu shot do not have any problems with it. Minor problems following a flu shot include:  
 soreness, redness, or swelling where the shot was given  hoarseness  sore, red or itchy eyes  cough  fever  aches  headache  itching  fatigue If these problems occur, they usually begin soon after the shot and last 1 or 2 days. More serious problems following a flu shot can include the following:  There may be a small increased risk of Guillain-Barré Syndrome (GBS) after inactivated flu vaccine. This risk has been estimated at 1 or 2 additional cases per million people vaccinated. This is much lower than the risk of severe complications from flu, which can be prevented by flu vaccine.  Young children who get the flu shot along with pneumococcal vaccine (PCV13) and/or DTaP vaccine at the same time might be slightly more likely to have a seizure caused by fever. Ask your doctor for more information. Tell your doctor if a child who is getting flu vaccine has ever had a seizure. Problems that could happen after any injected vaccine:  People sometimes faint after a medical procedure, including vaccination. Sitting or lying down for about 15 minutes can help prevent fainting, and injuries caused by a fall. Tell your doctor if you feel dizzy, or have vision changes or ringing in the ears.  Some people get severe pain in the shoulder and have difficulty moving the arm where a shot was given. This happens very rarely.  Any medication can cause a severe allergic reaction. Such reactions from a vaccine are very rare, estimated at about 1 in a million doses, and would happen within a few minutes to a few hours after the vaccination. As with any medicine, there is a very remote chance of a vaccine causing a serious injury or death. The safety of vaccines is always being monitored. For more information, visit: www.cdc.gov/vaccinesafety/ 
 
5. What if there is a serious reaction? What should I look for?  Look for anything that concerns you, such as signs of a severe allergic reaction, very high fever, or unusual behavior.  
 
Signs of a severe allergic reaction can include hives, swelling of the face and throat, difficulty breathing, a fast heartbeat, dizziness, and weakness  usually within a few minutes to a few hours after the vaccination. What should I do?  If you think it is a severe allergic reaction or other emergency that cant wait, call 9-1-1 and get the person to the nearest hospital. Otherwise, call your doctor.  Reactions should be reported to the Vaccine Adverse Event Reporting System (VAERS). Your doctor should file this report, or you can do it yourself through  the VAERS web site at www.vaers. [facility].gov, or by calling 9-701.498.8832. VAERS does not give medical advice. 6. The National Vaccine Injury Compensation Program 
 
The [facility] Vaccine Injury Compensation Program (VICP) is a federal program that was created to compensate people who may have been injured by certain vaccines. Persons who believe they may have been injured by a vaccine can learn about the program and about filing a claim by calling 6-483.559.2343 or visiting the [employer] website at www.[facility].gov/vaccinecompensation. There is a time limit to file a claim for compensation. 7. How can I learn more?  Ask your healthcare provider. He or she can give you the vaccine package insert or suggest other sources of information.  Call your local or state health department.  Contact the Centers for Disease Control and Prevention (CDC): 
- Call 3-354.895.9746 (5-612-WVC-INFO) or 
- Visit CDCs website at www.cdc.gov/flu Vaccine Information Statement Inactivated Influenza Vaccine 8/7/2015 
[address] [id number] Department of [employer] Centers for Disease Control and Prevention Office Use Only Schedule of Personalized Health Plan (Provide Copy to Patient) The best way to stay healthy is to live a healthy lifestyle. A healthy lifestyle includes regular exercise, eating a well-balanced diet, keeping a healthy weight and not smoking. Regular physical exams and screening tests are another important way to take care of yourself. Preventive exams provided by health care providers can find health problems early when treatment works best and can keep you from getting certain diseases or illnesses. Preventive services include exams, lab tests, screenings, shots, monitoring and information to help you take care of your own health. All people over 65 should have a pneumonia shot. Pneumonia shots are usually only needed once in a lifetime unless your doctor decides differently. All people over 65 should have a yearly flu shot. People over 65 are at medium to high risk for Hepatitis B. Three shots are needed for complete protection. In addition to your physical exam, some screening tests are recommended: 
 
Bone mass measurement (dexa scan) is recommended every two years Diabetes Mellitus screening is recommended every year. Glaucoma is an eye disease caused by high pressure in the eye. An eye exam is recommended every year. Cardiovascular screening tests that check your cholesterol and other blood fat (lipid) levels are recommended every five years. Colorectal Cancer screening tests help to find pre-cancerous polyps (growths in the colon) so they can be removed before they turn into cancer. Tests ordered for screening depend on your personal and family history risk factors. Screening for Breast Cancer is recommended yearly with a mammogram. 
 
Screening for Cervical Cancer is recommended every two years (annually for certain risk factors, such as previous history of STD or abnormal PAP in past 7 years), with a Pelvic Exam with PAP Here is a list of your current Health Maintenance items with a due date: 
Health Maintenance Topic Date Due  
 DTaP/Tdap/Td series (1 - Tdap) 02[birth year]03/1954  Shingrix Vaccine Age 50> (1 of 2) 02/03/1983  GLAUCOMA SCREENING Q2Y  02/03/1998  Influenza Age 5 to Adult  08/01/2018  MEDICARE YEARLY EXAM  10/17/2019  Bone Densitometry (Dexa) Screening  Completed  Pneumococcal 65+ Low/Medium Risk  Completed

## 2018-10-16 NOTE — PROGRESS NOTES
Jarek Chaudhary is a 80 y.o. female and presents for annual Medicare Wellness Visit. Problem List: Reviewed with patient and discussed risk factors. Patient Active Problem List  
Diagnosis Code  Hypertension I10  
 Hypercholesterolemia E78.00  
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 
Tito Carrion MD as PCP - General (Internal Medicine) Neli Baig, RN as Ambulatory Care Navigator Jinny Santoro, RN as Nurse Navigator Yasmany Urena as Physician (Psychology) Madison Ramires LPN as Ambulatory Care Navigator (Internal Medicine) PSH: Reviewed with patient Past Surgical History:  
Procedure Laterality Date  CARDIAC CATHETERIZATION  10/1990  
 normal  
 COLONOSCOPY  [id number]  
 diverticuli, hemorrhoids  EGD    
 x 3  
 EGD  [id number]  
 gastritis, duodenitis  HX CATARACT REMOVAL    
 bilateral  
 HX GYN    
 BTL  
 HX HEENT    
 tonsillectomy  HX ORTHOPAEDIC  2/2011  
 right knee replacement- Dr. Rubén Joel  HX ORTHOPAEDIC  12/10/12 LEFT TOTAL KNEE ARTHROPLASTY  WITH NAVIGATION (EPI W SPINAL ANES).  HX OTHER SURGICAL  02/2002  
 removal of meningioma  HX OTHER SURGICAL    
 colonoscopy/EGD  NEUROLOGICAL PROCEDURE UNLISTED    
 brain tumor resection yrs back SH: Reviewed with patient Social History Substance Use Topics  Smoking status: Never Smoker  Smokeless tobacco: Never Used  Alcohol use No  
 
 
FH: Reviewed with patient Family History Problem Relation Age of Onset  Hypertension Mother  Elevated Lipids Mother  Dementia Mother  Elevated Lipids Sister  Dementia Sister  Cancer Brother   
  colon  No Known Problems Child  No Known Problems Child  No Known Problems Child  No Known Problems Child Medications/Allergies: Reviewed with patient Current Outpatient Prescriptions on File Prior to Visit Medication Sig Dispense Refill  meloxicam (MOBIC) 7.5 mg tablet TAKE 1 TABLET BY MOUTH DAILY 90 Tab 5  
 doxepin (SINEQUAN) 10 mg capsule TAKE ONE CAPSULE BY MOUTH EVERY EVENING 90 Cap 2  
 LORazepam (ATIVAN) 0.5 mg tablet Take 1 Tab by mouth nightly as needed for Anxiety. Max Daily Amount: 0.5 mg. 30 Tab 0  
 CALCIUM 500 WITH D 500 mg(1,250mg) -400 unit tab TAKE 1 TABLET BY MOUTH EVERY DAY 30 Tab 0  
 acetaminophen (TYLENOL ARTHRITIS PAIN) 650 mg CR tablet Take 1 Tab by mouth two (2) times daily as needed for Pain. 60 Tab 3 No current facility-administered medications on file prior to visit. Allergies Allergen Reactions  Pcn [Penicillins] Itching Objective: 
Visit Vitals  /84 (BP 1 Location: Left arm, BP Patient Position: Sitting)  Pulse 66  Temp 98 °F (36.7 °C) (Oral)  Resp 20  
 Ht 5' 3\" (1.6 m)  Wt 142 lb (64.4 kg)  SpO2 100%  BMI 25.15 kg/m2 Body mass index is 25.15 kg/(m^2). Assessment of cognitive impairment: Alert and oriented x 2 Depression Screen: PHQ over the last two weeks 8/5/2015 Little interest or pleasure in doing things Several days Feeling down, depressed, irritable, or hopeless Several days Total Score PHQ 2 2 Fall Risk Assessment:   
Fall Risk Assessment, last 12 mths 10/16/2018 Able to walk? Yes Fall in past 12 months? No  
 
 
Functional Ability:  
Does the patient exhibit a steady gait? yes How long did it take the patient to get up and walk from a sitting position? <5 seconds Is the patient self reliant?  (ie can do own laundry, meals, household chores)  no Does the patient handle his/her own medications?  no 
  
Does the patient handle his/her own money?    no 
  
 Is the patients home safe (ie good lighting, handrails on stairs and bath, etc.)? no 
  
Did you notice or did patient express any hearing difficulties? no 
  
Did you notice or did patient express any vision difficulties? no 
  
  
 
Advance Care Planning:  
Patient was offered the opportunity to discuss advance care planning:  yes Does patient have an Advance Directive:  yes Plan:   
 
Orders Placed This Encounter  Influenza Vaccine Inactivated (IIV)(FLUAD), Subunit, Adjuvanted, IM, (51450)  CBC W/O DIFF  
 METABOLIC PANEL, COMPREHENSIVE  LIPID PANEL  
 VITAMIN D, 25 HYDROXY  Administration fee () for Medicare insured patients  memantine ER (NAMENDA XR) 28 mg capsule  rivastigmine (EXELON) 13.3 mg/24 hour patch  
 amLODIPine-benazepril (LOTREL) 5-20 mg per capsule  triamcinolone acetonide (KENALOG) 0.5 % ointment  alendronate (FOSAMAX) 70 mg tablet Health Maintenance Topic Date Due  
 DTaP/Tdap/Td series (1 - Tdap) 02[birth year]03/1954  Shingrix Vaccine Age 50> (1 of 2) 02/03/1983  GLAUCOMA SCREENING Q2Y  02/03/1998  Influenza Age 5 to Adult  08/01/2018  MEDICARE YEARLY EXAM  10/17/2019  Bone Densitometry (Dexa) Screening  Completed  Pneumococcal 65+ Low/Medium Risk  Completed *Patient verbalized understanding and agreement with the plan. A copy of the After Visit Summary with personalized health plan was given to the patient today.

## 2018-10-17 LAB
25(OH)D3+25(OH)D2 SERPL-MCNC: 33.2 NG/ML (ref 30–100)
ALBUMIN SERPL-MCNC: 4.4 G/DL (ref 3.5–4.7)
ALBUMIN/GLOB SERPL: 1.6 {RATIO} (ref 1.2–2.2)
ALP SERPL-CCNC: 81 IU/L (ref 39–117)
ALT SERPL-CCNC: 11 IU/L (ref 0–32)
AST SERPL-CCNC: 17 IU/L (ref 0–40)
BILIRUB SERPL-MCNC: 0.4 MG/DL (ref 0–1.2)
BUN SERPL-MCNC: 14 MG/DL (ref 8–27)
BUN/CREAT SERPL: 12 (ref 12–28)
CALCIUM SERPL-MCNC: 9.3 MG/DL (ref 8.7–10.3)
CHLORIDE SERPL-SCNC: 105 MMOL/L (ref 96–106)
CHOLEST SERPL-MCNC: 178 MG/DL (ref 100–199)
CO2 SERPL-SCNC: 24 MMOL/L (ref 20–29)
CREAT SERPL-MCNC: 1.15 MG/DL (ref 0.57–1)
ERYTHROCYTE [DISTWIDTH] IN BLOOD BY AUTOMATED COUNT: 16.4 % (ref 12.3–15.4)
GLOBULIN SER CALC-MCNC: 2.7 G/DL (ref 1.5–4.5)
GLUCOSE SERPL-MCNC: 85 MG/DL (ref 65–99)
HCT VFR BLD AUTO: 39.4 % (ref 34–46.6)
HDLC SERPL-MCNC: 52 MG/DL
HGB BLD-MCNC: 12.3 G/DL (ref 11.1–15.9)
INTERPRETATION, 910389: NORMAL
INTERPRETATION: NORMAL
LDLC SERPL CALC-MCNC: 109 MG/DL (ref 0–99)
MCH RBC QN AUTO: 23.7 PG (ref 26.6–33)
MCHC RBC AUTO-ENTMCNC: 31.2 G/DL (ref 31.5–35.7)
MCV RBC AUTO: 76 FL (ref 79–97)
PDF IMAGE, 910387: NORMAL
PLATELET # BLD AUTO: 265 X10E3/UL (ref 150–379)
POTASSIUM SERPL-SCNC: 4.4 MMOL/L (ref 3.5–5.2)
PROT SERPL-MCNC: 7.1 G/DL (ref 6–8.5)
RBC # BLD AUTO: 5.2 X10E6/UL (ref 3.77–5.28)
SODIUM SERPL-SCNC: 142 MMOL/L (ref 134–144)
TRIGL SERPL-MCNC: 83 MG/DL (ref 0–149)
VLDLC SERPL CALC-MCNC: 17 MG/DL (ref 5–40)
WBC # BLD AUTO: 4.4 X10E3/UL (ref 3.4–10.8)

## 2018-12-11 ENCOUNTER — TELEPHONE (OUTPATIENT)
Dept: INTERNAL MEDICINE CLINIC | Age: 83
End: 2018-12-11

## 2018-12-12 NOTE — TELEPHONE ENCOUNTER
Returned  Sumit Nicolasa call and reviewed the pt's medication list. Mr. Gilberto Kwong stated the pt's insurance will no longer cover her Namenda prescription. According to  Kavin, the pt's insurance did advise that an \"exception request\" could be placed and the medication may be paid for. Otherwise, the prescription is $400+.

## 2018-12-20 NOTE — TELEPHONE ENCOUNTER
Called LOWELL and advised him of the dosage change and that it should be covered. Per LOWELL, [employer] advised that they will not cover this medication at all. Advised him that the insurance does not kick in until after the beginning of the year and that our office cannot proceed with a tier exception or PA until the new insurance is in place. He voiced full understanding and stated that he will notify our office once the insurance kicks in and the office can proceed from there.

## 2019-01-08 DIAGNOSIS — M17.9 OSTEOARTHRITIS OF KNEE, UNSPECIFIED LATERALITY, UNSPECIFIED OSTEOARTHRITIS TYPE: ICD-10-CM

## 2019-01-08 RX ORDER — MELOXICAM 7.5 MG/1
TABLET ORAL
Qty: 90 TAB | Refills: 0 | Status: SHIPPED | OUTPATIENT
Start: 2019-01-08 | End: 2019-03-02 | Stop reason: SDUPTHER

## 2019-03-02 DIAGNOSIS — I10 ESSENTIAL HYPERTENSION: ICD-10-CM

## 2019-03-02 DIAGNOSIS — M17.9 OSTEOARTHRITIS OF KNEE, UNSPECIFIED LATERALITY, UNSPECIFIED OSTEOARTHRITIS TYPE: ICD-10-CM

## 2019-03-02 DIAGNOSIS — F03.90 DEMENTIA WITHOUT BEHAVIORAL DISTURBANCE, UNSPECIFIED DEMENTIA TYPE: ICD-10-CM

## 2019-03-05 RX ORDER — AMLODIPINE AND BENAZEPRIL HYDROCHLORIDE 5; 20 MG/1; MG/1
CAPSULE ORAL
Qty: 90 CAP | Refills: 0 | Status: SHIPPED | OUTPATIENT
Start: 2019-03-05 | End: 2019-10-14 | Stop reason: SDUPTHER

## 2019-03-05 RX ORDER — MELOXICAM 7.5 MG/1
TABLET ORAL
Qty: 90 TAB | Refills: 0 | Status: SHIPPED | OUTPATIENT
Start: 2019-03-05 | End: 2019-10-14 | Stop reason: SDUPTHER

## 2019-03-05 RX ORDER — MEMANTINE HYDROCHLORIDE 28 MG/1
CAPSULE, EXTENDED RELEASE ORAL
Qty: 90 CAP | Refills: 0 | Status: SHIPPED | OUTPATIENT
Start: 2019-03-05 | End: 2019-04-15 | Stop reason: CLARIF

## 2019-03-05 RX ORDER — RIVASTIGMINE 13.3 MG/24H
PATCH, EXTENDED RELEASE TRANSDERMAL
Qty: 90 PATCH | Refills: 0 | Status: SHIPPED | OUTPATIENT
Start: 2019-03-05 | End: 2019-10-14 | Stop reason: SDUPTHER

## 2019-04-03 RX ORDER — ALENDRONATE SODIUM 70 MG/1
TABLET ORAL
Qty: 24 TAB | Refills: 0 | Status: SHIPPED | OUTPATIENT
Start: 2019-04-03 | End: 2019-04-15 | Stop reason: SDUPTHER

## 2019-04-15 ENCOUNTER — OFFICE VISIT (OUTPATIENT)
Dept: INTERNAL MEDICINE CLINIC | Age: 84
End: 2019-04-15

## 2019-04-15 ENCOUNTER — HOSPITAL ENCOUNTER (OUTPATIENT)
Dept: LAB | Age: 84
Discharge: HOME | End: 2019-04-15
Payer: MEDICARE

## 2019-04-15 VITALS
BODY MASS INDEX: 25.05 KG/M2 | SYSTOLIC BLOOD PRESSURE: 122 MMHG | TEMPERATURE: 95.7 F | DIASTOLIC BLOOD PRESSURE: 86 MMHG | WEIGHT: 141.4 LBS | HEIGHT: 63 IN | RESPIRATION RATE: 16 BRPM | OXYGEN SATURATION: 97 % | HEART RATE: 69 BPM

## 2019-04-15 DIAGNOSIS — M17.0 PRIMARY OSTEOARTHRITIS OF BOTH KNEES: ICD-10-CM

## 2019-04-15 DIAGNOSIS — G47.00 INSOMNIA, UNSPECIFIED TYPE: ICD-10-CM

## 2019-04-15 DIAGNOSIS — F05 SUNDOWNING: Primary | ICD-10-CM

## 2019-04-15 DIAGNOSIS — F02.80 LATE ONSET ALZHEIMER'S DISEASE WITHOUT BEHAVIORAL DISTURBANCE (HCC): ICD-10-CM

## 2019-04-15 DIAGNOSIS — G30.1 LATE ONSET ALZHEIMER'S DISEASE WITHOUT BEHAVIORAL DISTURBANCE (HCC): ICD-10-CM

## 2019-04-15 DIAGNOSIS — F05 SUNDOWNING: ICD-10-CM

## 2019-04-15 DIAGNOSIS — I10 ESSENTIAL HYPERTENSION: ICD-10-CM

## 2019-04-15 PROCEDURE — 80053 COMPREHEN METABOLIC PANEL: CPT

## 2019-04-15 PROCEDURE — 36415 COLL VENOUS BLD VENIPUNCTURE: CPT

## 2019-04-15 PROCEDURE — 85027 COMPLETE CBC AUTOMATED: CPT

## 2019-04-15 RX ORDER — MEMANTINE HYDROCHLORIDE 10 MG/1
10 TABLET ORAL DAILY
Qty: 90 TAB | Refills: 1 | Status: SHIPPED | OUTPATIENT
Start: 2019-04-15 | End: 2019-10-14 | Stop reason: SDUPTHER

## 2019-04-15 RX ORDER — QUETIAPINE FUMARATE 25 MG/1
TABLET, FILM COATED ORAL
Qty: 90 TAB | Refills: 4 | Status: SHIPPED | OUTPATIENT
Start: 2019-04-15 | End: 2019-10-14 | Stop reason: SDUPTHER

## 2019-04-15 RX ORDER — QUETIAPINE FUMARATE 25 MG/1
25 TABLET, FILM COATED ORAL
Qty: 30 TAB | Refills: 4 | Status: SHIPPED | OUTPATIENT
Start: 2019-04-15 | End: 2019-04-15 | Stop reason: SDUPTHER

## 2019-04-15 NOTE — PROGRESS NOTES
Health Maintenance Due Topic Date Due  
 DTaP/Tdap/Td series (1 - Tdap) 02/03/1954  Shingrix Vaccine Age 50> (1 of 2) 02/03/1983  GLAUCOMA SCREENING Q2Y  02/03/1998  Pneumococcal 65+ years (2 of 2 - PPSV23) 10/16/2018 Chief Complaint Patient presents with  Hypertension  Cholesterol Problem  Dementia 1. Have you been to the ER, urgent care clinic since your last visit? Hospitalized since your last visit? No 
 
2. Have you seen or consulted any other health care providers outside of the [address] [place] [address] since your last visit? Include any pap smears or colon screening. No 
 
3) Do you have an Advance Directive on file? yes 4) Are you interested in receiving information on Advance Directives? NO Patient is accompanied by son I have received verbal consent from Joby Saldaña to discuss any/all medical information while they are present in the room.

## 2019-04-15 NOTE — PROGRESS NOTES
HISTORY OF PRESENT ILLNESS Mushtaq Small is a 80 y.o. female here for follow up. Her dementia is progressing, not able to afford Namenda Exar 20 mg. Using Exelon patch. Having a lot of sundowning nowadays. Doxepin is not helping her to sleep. Has lot of insomnia. Has HTN,on meds. no need for refill. Need lab work. No chest pain,or palpitation. Hypertension Pertinent negatives include no chest pain, no palpitations, no blurred vision and no nausea. Dementia Her past medical history is significant for hypertension. Cholesterol Problem Pertinent negatives include no chest pain. Insomnia Pertinent negatives include no chest pain. Follow-up Pertinent negatives include no chest pain. Review of Systems Constitutional: Negative. Negative for chills and fever. HENT: Negative. Eyes: Negative. Negative for blurred vision and double vision. Respiratory: Negative. Negative for cough and hemoptysis. Cardiovascular: Negative. Negative for chest pain and palpitations. Gastrointestinal: Negative. Negative for heartburn and nausea. Genitourinary: Negative. Skin: Negative. Neurological: Negative. Psychiatric/Behavioral: The patient has insomnia. Physical Exam  
Constitutional: She appears well-developed and well-nourished. No distress. Neck: Normal range of motion. Neck supple. No JVD present. No thyromegaly present. Cardiovascular: Normal rate, regular rhythm, normal heart sounds and intact distal pulses. Pulmonary/Chest: Effort normal and breath sounds normal. No respiratory distress. She has no wheezes. Musculoskeletal: She exhibits no edema or tenderness. Psychiatric: She has a normal mood and affect. Her behavior is normal.  
Advanced dementia present. Has a lot of sundowning. Not able to sleep at night. ASSESSMENT and PLAN Diagnoses and all orders for this visit: 1. Sundowning Will discontinue doxepin.   Will give, 
 -     QUEtiapine (SEROQUEL) 25 mg tablet; Take 1 Tab by mouth nightly. The risks and benefits of treatment were discussed as well as the potential side effects. The patient verbalized understanding and agrees to the current treatment plan. The patient is instructed to call the office with any side effects Follow-up if not better. 2. Late onset Alzheimer's disease without behavioral disturbance Not able to afford Namenda XR. Will change, 
-     memantine (NAMENDA) 10 mg tablet; Take 1 Tab by mouth daily. D/C namenda XR 28 mg 
-     METABOLIC PANEL, COMPREHENSIVE Also on Exelon patch. 3. Essential hypertension Stable, diet controlled. Will check, 
-     METABOLIC PANEL, COMPREHENSIVE 
-     CBC W/O DIFF 4. Primary osteoarthritis of both knees Using Tylenol as needed. 5. Insomnia, unspecified type We will try Seroquel at night. Discussed expected course/resolution/complications of diagnosis in detail with patient. Medication risks/benefits/costs/interactions/alternatives discussed with patient. Pt was given an after visit summary which includes diagnoses, current medications & vitals. Pt expressed understanding with the diagnosis and plan.

## 2019-04-16 LAB
ALBUMIN SERPL-MCNC: 4.3 G/DL (ref 3.5–4.7)
ALBUMIN/GLOB SERPL: 1.4 {RATIO} (ref 1.2–2.2)
ALP SERPL-CCNC: 85 IU/L (ref 39–117)
ALT SERPL-CCNC: 10 IU/L (ref 0–32)
AST SERPL-CCNC: 16 IU/L (ref 0–40)
BILIRUB SERPL-MCNC: 0.4 MG/DL (ref 0–1.2)
BUN SERPL-MCNC: 14 MG/DL (ref 8–27)
BUN/CREAT SERPL: 11 (ref 12–28)
CALCIUM SERPL-MCNC: 9.3 MG/DL (ref 8.7–10.3)
CHLORIDE SERPL-SCNC: 104 MMOL/L (ref 96–106)
CO2 SERPL-SCNC: 25 MMOL/L (ref 20–29)
CREAT SERPL-MCNC: 1.23 MG/DL (ref 0.57–1)
ERYTHROCYTE [DISTWIDTH] IN BLOOD BY AUTOMATED COUNT: 15.2 % (ref 12.3–15.4)
GLOBULIN SER CALC-MCNC: 3 G/DL (ref 1.5–4.5)
GLUCOSE SERPL-MCNC: 83 MG/DL (ref 65–99)
HCT VFR BLD AUTO: 38.7 % (ref 34–46.6)
HGB BLD-MCNC: 12.6 G/DL (ref 11.1–15.9)
INTERPRETATION: NORMAL
MCH RBC QN AUTO: 23.5 PG (ref 26.6–33)
MCHC RBC AUTO-ENTMCNC: 32.6 G/DL (ref 31.5–35.7)
MCV RBC AUTO: 72 FL (ref 79–97)
PLATELET # BLD AUTO: 248 X10E3/UL (ref 150–379)
POTASSIUM SERPL-SCNC: 4.4 MMOL/L (ref 3.5–5.2)
PROT SERPL-MCNC: 7.3 G/DL (ref 6–8.5)
RBC # BLD AUTO: 5.36 X10E6/UL (ref 3.77–5.28)
SODIUM SERPL-SCNC: 142 MMOL/L (ref 134–144)
WBC # BLD AUTO: 4.5 X10E3/UL (ref 3.4–10.8)

## 2019-06-10 DIAGNOSIS — I10 ESSENTIAL HYPERTENSION: ICD-10-CM

## 2019-06-11 RX ORDER — AMLODIPINE AND BENAZEPRIL HYDROCHLORIDE 5; 20 MG/1; MG/1
CAPSULE ORAL
Qty: 90 CAP | Refills: 0 | Status: SHIPPED | OUTPATIENT
Start: 2019-06-11 | End: 2019-10-14 | Stop reason: SDUPTHER

## 2019-10-14 ENCOUNTER — OFFICE VISIT (OUTPATIENT)
Dept: INTERNAL MEDICINE CLINIC | Age: 84
End: 2019-10-14

## 2019-10-14 VITALS
HEART RATE: 62 BPM | BODY MASS INDEX: 24.98 KG/M2 | SYSTOLIC BLOOD PRESSURE: 134 MMHG | HEIGHT: 63 IN | WEIGHT: 141 LBS | OXYGEN SATURATION: 96 % | DIASTOLIC BLOOD PRESSURE: 82 MMHG | TEMPERATURE: 98.2 F | RESPIRATION RATE: 15 BRPM

## 2019-10-14 DIAGNOSIS — I10 ESSENTIAL HYPERTENSION: ICD-10-CM

## 2019-10-14 DIAGNOSIS — G30.1 LATE ONSET ALZHEIMER'S DISEASE WITHOUT BEHAVIORAL DISTURBANCE (HCC): ICD-10-CM

## 2019-10-14 DIAGNOSIS — F02.80 LATE ONSET ALZHEIMER'S DISEASE WITHOUT BEHAVIORAL DISTURBANCE (HCC): ICD-10-CM

## 2019-10-14 DIAGNOSIS — F05 SUNDOWNING: ICD-10-CM

## 2019-10-14 DIAGNOSIS — M17.9 OSTEOARTHRITIS OF KNEE, UNSPECIFIED LATERALITY, UNSPECIFIED OSTEOARTHRITIS TYPE: ICD-10-CM

## 2019-10-14 DIAGNOSIS — F03.90 DEMENTIA WITHOUT BEHAVIORAL DISTURBANCE, UNSPECIFIED DEMENTIA TYPE: Primary | ICD-10-CM

## 2019-10-14 DIAGNOSIS — M81.0 OSTEOPOROSIS, UNSPECIFIED OSTEOPOROSIS TYPE, UNSPECIFIED PATHOLOGICAL FRACTURE PRESENCE: ICD-10-CM

## 2019-10-14 DIAGNOSIS — Z00.00 MEDICARE ANNUAL WELLNESS VISIT, SUBSEQUENT: ICD-10-CM

## 2019-10-14 DIAGNOSIS — Z23 ENCOUNTER FOR IMMUNIZATION: ICD-10-CM

## 2019-10-14 DIAGNOSIS — M17.0 PRIMARY OSTEOARTHRITIS OF BOTH KNEES: ICD-10-CM

## 2019-10-14 RX ORDER — RIVASTIGMINE 13.3 MG/24H
PATCH, EXTENDED RELEASE TRANSDERMAL
Qty: 90 PATCH | Refills: 1 | Status: SHIPPED | OUTPATIENT
Start: 2019-10-14 | End: 2020-04-17

## 2019-10-14 RX ORDER — MEMANTINE HYDROCHLORIDE 10 MG/1
10 TABLET ORAL DAILY
Qty: 90 TAB | Refills: 1 | Status: SHIPPED | OUTPATIENT
Start: 2019-10-14 | End: 2019-12-30 | Stop reason: SDUPTHER

## 2019-10-14 RX ORDER — MELOXICAM 7.5 MG/1
TABLET ORAL
Qty: 90 TAB | Refills: 0 | Status: SHIPPED | OUTPATIENT
Start: 2019-10-14 | End: 2020-01-09

## 2019-10-14 RX ORDER — QUETIAPINE FUMARATE 25 MG/1
TABLET, FILM COATED ORAL
Qty: 45 TAB | Refills: 1 | Status: SHIPPED | OUTPATIENT
Start: 2019-10-14 | End: 2020-11-11 | Stop reason: ALTCHOICE

## 2019-10-14 RX ORDER — ALENDRONATE SODIUM 70 MG/1
TABLET ORAL
Qty: 28 TAB | Refills: 11 | Status: SHIPPED | OUTPATIENT
Start: 2019-10-14 | End: 2020-11-11 | Stop reason: SDUPTHER

## 2019-10-14 RX ORDER — AMLODIPINE AND BENAZEPRIL HYDROCHLORIDE 5; 20 MG/1; MG/1
CAPSULE ORAL
Qty: 90 CAP | Refills: 1 | Status: SHIPPED | OUTPATIENT
Start: 2019-10-14 | End: 2020-06-02

## 2019-10-14 NOTE — PATIENT INSTRUCTIONS
Medicare Wellness Visit, Female The best way to live healthy is to have a lifestyle where you eat a well-balanced diet, exercise regularly, limit alcohol use, and quit all forms of tobacco/nicotine, if applicable. Regular preventive services are another way to keep healthy. Preventive services (vaccines, screening tests, monitoring & exams) can help personalize your care plan, which helps you manage your own care. Screening tests can find health problems at the earliest stages, when they are easiest to treat. [facility] follows the current, evidence-based guidelines published by the [facility] Abisai Sury ([facility]) when recommending preventive services for our patients. Because we follow these guidelines, sometimes recommendations change over time as research supports it. (For example, mammograms used to be recommended annually. Even though Medicare will still pay for an annual mammogram, the newer guidelines recommend a mammogram every two years for women of average risk.) Of course, you and your doctor may decide to screen more often for some diseases, based on your risk and your health status. Preventive services for you include: - Medicare offers their members a free annual wellness visit, which is time for you and your primary care provider to discuss and plan for your preventive service needs. Take advantage of this benefit every year! 
-All adults over the age of 72 should receive the recommended pneumonia vaccines. Current USPSTF guidelines recommend a series of two vaccines for the best pneumonia protection.  
-All adults should have a flu vaccine yearly and a tetanus vaccine every 10 years. All adults age 61 and older should receive a shingles vaccine once in their lifetime.   
-A bone mass density test is recommended when a woman turns 65 to screen for osteoporosis. This test is only recommended one time, as a screening. Some providers will use this same test as a disease monitoring tool if you already have osteoporosis. -All adults age 38-68 who are overweight should have a diabetes screening test once every three years.  
-Other screening tests and preventive services for persons with diabetes include: an eye exam to screen for diabetic retinopathy, a kidney function test, a foot exam, and stricter control over your cholesterol.  
-Cardiovascular screening for adults with routine risk involves an electrocardiogram (ECG) at intervals determined by your doctor.  
-Colorectal cancer screenings should be done for adults age 54-65 with no increased risk factors for colorectal cancer. There are a number of acceptable methods of screening for this type of cancer. Each test has its own benefits and drawbacks. Discuss with your doctor what is most appropriate for you during your annual wellness visit. The different tests include: colonoscopy (considered the best screening method), a fecal occult blood test, a fecal DNA test, and sigmoidoscopy. -Breast cancer screenings are recommended every other year for women of normal risk, age 54-69. 
-Cervical cancer screenings for women over age 72 are only recommended with certain risk factors.  
-All adults born between [facility] should be screened once for Hepatitis C. Here is a list of your current Health Maintenance items (your personalized list of preventive services) with a due date: 
Health Maintenance Due Topic Date Due  
 DTaP/Tdap/Td  (1 - Tdap) 02[birth year]03/1954  Shingles Vaccine (1 of 2) 02/03/1983  Glaucoma Screening   02/03/1998  Pneumococcal Vaccine (2 of 2 - PPSV23) 10/16/2018

## 2019-10-14 NOTE — PROGRESS NOTES
Health Maintenance Due   Topic Date Due    DTaP/Tdap/Td series (1 - Tdap) 02/03/1954    Shingrix Vaccine Age 50> (1 of 2) 02/03/1983    GLAUCOMA SCREENING Q2Y  02/03/1998    Pneumococcal 65+ years (2 of 2 - PPSV23) 10/16/2018    Influenza Age 5 to Adult  08/01/2019    MEDICARE YEARLY EXAM  10/17/2019       Chief Complaint   Patient presents with    Annual Wellness Visit    Dementia    Hypertension    Cholesterol Problem       1. Have you been to the ER, urgent care clinic since your last visit? Hospitalized since your last visit? No    2. Have you seen or consulted any other health care providers outside of the [address] [place] [address] since your last visit? Include any pap smears or colon screening. No    3) Do you have an Advance Directive on file? yes    4) Are you interested in receiving information on Advance Directives? NO      Patient is accompanied by son I have received verbal consent from Sam Whitley to discuss any/all medical information while they are present in the room. Sam Whitley is a 80 y.o. female  who presents for routine immunization(s) Fluad. Patient denies any symptoms , reactions or allergies that would exclude them from being immunized today. Risks and adverse reactions were discussed and the VIS was given to them. Patient voiced full understanding and signed Adult Immunization Consent form. All questions were addressed. Patient was observed for 10 min post injection. There were no reactions observed.     Sharlene Haley LPN

## 2019-10-14 NOTE — PROGRESS NOTES
This is the Subsequent Medicare Annual Wellness Exam, performed 12 months or more after the Initial AWV or the last Subsequent AWV    I have reviewed the patient's medical history in detail and updated the computerized patient record. History     Past Medical History:   Diagnosis Date    Arthritis     back and knees    Dementia ([facility] [address] 2/4/2013    mild    Hypercholesterolemia     Hypertension     Other ill-defined conditions(799.89)     elevated cholesterol    PUD (peptic ulcer disease)     Stroke ([facility] [address]     tia    TIA (transient ischaemic attack)       Past Surgical History:   Procedure Laterality Date    CARDIAC CATHETERIZATION  10/1990    normal    COLONOSCOPY  [id number]    diverticuli, hemorrhoids    EGD      x 3    EGD  [id number]    gastritis, duodenitis    HX CATARACT REMOVAL      bilateral    HX GYN      BTL    HX HEENT      tonsillectomy    HX ORTHOPAEDIC  2/2011    right knee replacement- Dr. Corinne Hickman    HX ORTHOPAEDIC  12/10/12     LEFT TOTAL KNEE ARTHROPLASTY  WITH NAVIGATION (EPI W SPINAL ANES).  HX OTHER SURGICAL  02/2002    removal of meningioma    HX OTHER SURGICAL      colonoscopy/EGD    NEUROLOGICAL PROCEDURE UNLISTED      brain tumor resection yrs back     Current Outpatient Medications   Medication Sig Dispense Refill    memantine (NAMENDA) 10 mg tablet Take 1 Tab by mouth daily. D?C namenda XR 28 mg 90 Tab 1    QUEtiapine (SEROQUEL) 25 mg tablet TAKE 1 TABLET BY MOUTH EVERY NIGHT 90 Tab 4    meloxicam (MOBIC) 7.5 mg tablet TAKE 1 TABLET BY MOUTH ONCE DAILY 90 Tab 0    amLODIPine-benazepril (LOTREL) 5-20 mg per capsule TAKE 1 CAPSULE BY MOUTH EVERY DAY NEED OFFICE VISIT FOR FURTHER REFILLS 90 Cap 0    rivastigmine (EXELON) 13.3 mg/24 hour patch APPLY 1 PATCH TO THE SKIN EVERY DAY 90 Patch 0    triamcinolone acetonide (KENALOG) 0.5 % ointment Apply  to affected area two (2) times a day.  use thin layer 30 g 0    alendronate (FOSAMAX) 70 mg tablet TAKE 1 TABLET BY MOUTH EVERY SUNDAY 28 Tab 11    doxepin (SINEQUAN) 10 mg capsule TAKE ONE CAPSULE BY MOUTH EVERY EVENING 90 Cap 2    LORazepam (ATIVAN) 0.5 mg tablet Take 1 Tab by mouth nightly as needed for Anxiety. Max Daily Amount: 0.5 mg. 30 Tab 0    CALCIUM 500 WITH D 500 mg(1,250mg) -400 unit tab TAKE 1 TABLET BY MOUTH EVERY DAY 30 Tab 0    acetaminophen (TYLENOL ARTHRITIS PAIN) 650 mg CR tablet Take 1 Tab by mouth two (2) times daily as needed for Pain. 60 Tab 3     Allergies   Allergen Reactions    Pcn [Penicillins] Itching     Family History   Problem Relation Age of Onset    Hypertension Mother    [de-identified] Elevated Lipids Mother     Dementia Mother    Bonnee Bird Elevated Lipids Sister     Dementia Sister     Cancer Brother         colon    No Known Problems Child     No Known Problems Child     No Known Problems Child     No Known Problems Child      Social History     Tobacco Use    Smoking status: Never Smoker    Smokeless tobacco: Never Used   Substance Use Topics    Alcohol use: No     Patient Active Problem List   Diagnosis Code    Hypertension I10    Hypercholesterolemia E78.00    DJD (degenerative joint disease) of knee M17.10    Dementia ([facility] [address] F03.90    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 (HCC) F02.80    Depressive disorder, not elsewhere classified F32.9    Anxiety state, unspecified F41.1    Moderate dementia (HCC) F03.90    Advance care planning Z71.89       Depression Risk Factor Screening:     3 most recent PHQ Screens 4/15/2019   Little interest or pleasure in doing things Not at all   Feeling down, depressed, irritable, or hopeless Not at all   Total Score PHQ 2 0     Alcohol Risk Factor Screening: You do not drink alcohol or very rarely. Functional Ability and Level of Safety:   Hearing Loss  Hearing is good.     Activities of Daily Living  The home contains: using cane  Patient needs help with:  transportation, shopping, preparing meals, laundry, housework, managing medications and managing money    Fall Risk  Fall Risk Assessment, last 12 mths 4/15/2019   Able to walk? Yes   Fall in past 12 months?  No       Abuse Screen  Patient is not abused    Cognitive Screening   Evaluation of Cognitive Function:  Has your family/caregiver stated any concerns about your memory: yes  Normal, Abnormal    Patient Care Team   Patient Care Team:  Jennifer Velázquez MD as PCP - General (Internal Medicine)  Windy Cullen, RN as Ambulatory Care Navigator  Jarrod Russell RN as Nurse Navigator  Charla Leos as Physician (Psychology)  Lanny Hart LPN as Ambulatory Care Navigator (Internal Medicine)    Assessment/Plan   Education and counseling provided:  Are appropriate based on today's review and evaluation  End-of-Life planning (with patient's consent)  Screening Mammography  Bone mass measurement (DEXA)  Screening for glaucoma        Health Maintenance Due   Topic Date Due    DTaP/Tdap/Td series (1 - Tdap) 02/03/1954    Shingrix Vaccine Age 50> (1 of 2) 02/03/1983    GLAUCOMA SCREENING Q2Y  02/03/1998    Pneumococcal 65+ years (2 of 2 - PPSV23) 10/16/2018

## 2019-10-14 NOTE — PROGRESS NOTES
HISTORY OF PRESENT ILLNESS  Kareen Rubalcava is a 80 y.o. female here for follow up. Her dementia is progressing, on Namenda and Exelon patch. Needs refill. Having a lot of sundowning nowadays. Did not start Seroquel for side effects. Reassured son. Has HTN,on meds. no need for refill. Need lab work. Need flu shot. Here for Medicare wellness visit. Has living will. Hypertension    Pertinent negatives include no chest pain, no palpitations, no blurred vision and no nausea. Cholesterol Problem   Pertinent negatives include no chest pain. Dementia    Her past medical history is significant for hypertension. Insomnia   Pertinent negatives include no chest pain. Follow-up   Pertinent negatives include no chest pain. Medication Refill   Pertinent negatives include no chest pain. Review of Systems   Constitutional: Negative. Negative for chills and fever. HENT: Negative. Eyes: Negative. Negative for blurred vision and double vision. Respiratory: Negative. Negative for cough and hemoptysis. Cardiovascular: Negative. Negative for chest pain and palpitations. Gastrointestinal: Negative. Negative for heartburn and nausea. Genitourinary: Negative. Skin: Negative. Neurological: Negative. Psychiatric/Behavioral: Positive for memory loss. The patient has insomnia. Physical Exam   Constitutional: She appears well-developed and well-nourished. No distress. Neck: Normal range of motion. Neck supple. No JVD present. No thyromegaly present. Cardiovascular: Normal rate, regular rhythm, normal heart sounds and intact distal pulses. Pulmonary/Chest: Effort normal and breath sounds normal. No respiratory distress. She has no wheezes. Musculoskeletal: She exhibits no edema or tenderness. Psychiatric: She has a normal mood and affect. Her behavior is normal.   Advanced dementia present. Has a lot of sundowning. Not able to sleep at night.        ASSESSMENT and PLAN  Diagnoses and all orders for this visit:    1. Dementia without behavioral disturbance, unspecified dementia type ([facility] [address]    Advanced. Son is a caregiver. We will continue,  -     rivastigmine (EXELON) 13.3 mg/24 hour patch; APPLY 1 PATCH TO THE SKIN EVERY DAY  -     memantine (NAMENDA) 10 mg tablet; Take 1 Tab by mouth daily. D?C namenda XR 28 mg    Will start at half dosage of Seroquel. Reassured son. -     QUEtiapine (SEROQUEL) 25 mg tablet; TAKE half  TABLET BY MOUTH EVERY NIGHT    2. Essential hypertension  -     METABOLIC PANEL, COMPREHENSIVE  -     amLODIPine-benazepril (LOTREL) 5-20 mg per capsule; TAKE 1 CAPSULE BY MOUTH EVERY DAY NEED OFFICE VISIT FOR FURTHER REFILLS    3. Medicare annual wellness visit, subsequent    4. Primary osteoarthritis of both knees    5. Osteoarthritis of knee, unspecified laterality, unspecified osteoarthritis type    Stable. Will refill,  -     meloxicam (MOBIC) 7.5 mg tablet; TAKE 1 TABLET BY MOUTH ONCE DAILY    6. Late onset Alzheimer's disease without behavioral disturbance ([facility]    7. Sundowning  -     QUEtiapine (SEROQUEL) 25 mg tablet; TAKE half  TABLET BY MOUTH EVERY NIGHT    8. Encounter for immunization    Will give,  -     INFLUENZA VACCINE INACTIVATED (IIV), SUBUNIT, ADJUVANTED, IM    9. Osteoporosis, unspecified osteoporosis type, unspecified pathological fracture presence    On calcium and vitamin D. Will refill,  -     alendronate (FOSAMAX) 70 mg tablet; TAKE 1 TABLET BY MOUTH EVERY SUNDAY              Discussed expected course/resolution/complications of diagnosis in detail with patient. Medication risks/benefits/costs/interactions/alternatives discussed with patient. Pt was given an after visit summary which includes diagnoses, current medications & vitals. Pt expressed understanding with the diagnosis and plan.

## 2019-10-15 LAB
ALBUMIN SERPL-MCNC: 4.2 G/DL (ref 3.5–4.7)
ALBUMIN/GLOB SERPL: 1.4 {RATIO} (ref 1.2–2.2)
ALP SERPL-CCNC: 89 IU/L (ref 39–117)
ALT SERPL-CCNC: 10 IU/L (ref 0–32)
AST SERPL-CCNC: 16 IU/L (ref 0–40)
BILIRUB SERPL-MCNC: 0.4 MG/DL (ref 0–1.2)
BUN SERPL-MCNC: 12 MG/DL (ref 8–27)
BUN/CREAT SERPL: 11 (ref 12–28)
CALCIUM SERPL-MCNC: 9.4 MG/DL (ref 8.7–10.3)
CHLORIDE SERPL-SCNC: 106 MMOL/L (ref 96–106)
CO2 SERPL-SCNC: 24 MMOL/L (ref 20–29)
CREAT SERPL-MCNC: 1.06 MG/DL (ref 0.57–1)
GLOBULIN SER CALC-MCNC: 3.1 G/DL (ref 1.5–4.5)
GLUCOSE SERPL-MCNC: 87 MG/DL (ref 65–99)
INTERPRETATION: NORMAL
POTASSIUM SERPL-SCNC: 4 MMOL/L (ref 3.5–5.2)
PROT SERPL-MCNC: 7.3 G/DL (ref 6–8.5)
SODIUM SERPL-SCNC: 146 MMOL/L (ref 134–144)

## 2019-11-11 ENCOUNTER — DOCUMENTATION ONLY (OUTPATIENT)
Dept: INTERNAL MEDICINE CLINIC | Age: 84
End: 2019-11-11

## 2019-11-11 NOTE — PROGRESS NOTES
RIVASTIGMINE DIS 13.3/24, use as directed, is approved through 12/31/2020 under your Medicare Part D  benefit.

## 2019-12-30 DIAGNOSIS — F03.90 DEMENTIA WITHOUT BEHAVIORAL DISTURBANCE, UNSPECIFIED DEMENTIA TYPE: ICD-10-CM

## 2019-12-30 RX ORDER — MEMANTINE HYDROCHLORIDE 10 MG/1
TABLET ORAL
Qty: 90 TAB | Refills: 0 | Status: SHIPPED | OUTPATIENT
Start: 2019-12-30 | End: 2020-04-07

## 2020-01-09 DIAGNOSIS — M17.9 OSTEOARTHRITIS OF KNEE, UNSPECIFIED LATERALITY, UNSPECIFIED OSTEOARTHRITIS TYPE: ICD-10-CM

## 2020-01-09 RX ORDER — MELOXICAM 7.5 MG/1
TABLET ORAL
Qty: 90 TAB | Refills: 0 | Status: SHIPPED | OUTPATIENT
Start: 2020-01-09 | End: 2020-11-11 | Stop reason: SDUPTHER

## 2020-04-07 DIAGNOSIS — F03.90 DEMENTIA WITHOUT BEHAVIORAL DISTURBANCE, UNSPECIFIED DEMENTIA TYPE: ICD-10-CM

## 2020-04-07 RX ORDER — MEMANTINE HYDROCHLORIDE 10 MG/1
TABLET ORAL
Qty: 90 TAB | Refills: 0 | Status: SHIPPED | OUTPATIENT
Start: 2020-04-07 | End: 2020-11-11 | Stop reason: SDUPTHER

## 2020-04-17 DIAGNOSIS — F03.90 DEMENTIA WITHOUT BEHAVIORAL DISTURBANCE, UNSPECIFIED DEMENTIA TYPE: ICD-10-CM

## 2020-04-17 RX ORDER — RIVASTIGMINE 13.3 MG/24H
PATCH, EXTENDED RELEASE TRANSDERMAL
Qty: 90 PATCH | Refills: 1 | Status: SHIPPED | OUTPATIENT
Start: 2020-04-17 | End: 2020-11-11 | Stop reason: SDUPTHER

## 2020-06-02 DIAGNOSIS — I10 ESSENTIAL HYPERTENSION: ICD-10-CM

## 2020-06-02 RX ORDER — AMLODIPINE AND BENAZEPRIL HYDROCHLORIDE 5; 20 MG/1; MG/1
CAPSULE ORAL
Qty: 90 CAP | Refills: 1 | Status: SHIPPED | OUTPATIENT
Start: 2020-06-02 | End: 2020-11-11 | Stop reason: SDUPTHER

## 2020-11-11 ENCOUNTER — VIRTUAL VISIT (OUTPATIENT)
Dept: INTERNAL MEDICINE CLINIC | Age: 85
End: 2020-11-11
Payer: MEDICARE

## 2020-11-11 DIAGNOSIS — F05 SUNDOWNING: ICD-10-CM

## 2020-11-11 DIAGNOSIS — I10 ESSENTIAL HYPERTENSION: Primary | ICD-10-CM

## 2020-11-11 DIAGNOSIS — M17.9 OSTEOARTHRITIS OF KNEE, UNSPECIFIED LATERALITY, UNSPECIFIED OSTEOARTHRITIS TYPE: ICD-10-CM

## 2020-11-11 DIAGNOSIS — G30.1 LATE ONSET ALZHEIMER'S DISEASE WITHOUT BEHAVIORAL DISTURBANCE (HCC): ICD-10-CM

## 2020-11-11 DIAGNOSIS — F02.80 LATE ONSET ALZHEIMER'S DISEASE WITHOUT BEHAVIORAL DISTURBANCE (HCC): ICD-10-CM

## 2020-11-11 DIAGNOSIS — Z00.00 MEDICARE ANNUAL WELLNESS VISIT, SUBSEQUENT: ICD-10-CM

## 2020-11-11 DIAGNOSIS — E55.9 VITAMIN D DEFICIENCY: ICD-10-CM

## 2020-11-11 DIAGNOSIS — Z71.89 ACP (ADVANCE CARE PLANNING): ICD-10-CM

## 2020-11-11 DIAGNOSIS — F03.90 DEMENTIA WITHOUT BEHAVIORAL DISTURBANCE, UNSPECIFIED DEMENTIA TYPE: ICD-10-CM

## 2020-11-11 DIAGNOSIS — M81.0 OSTEOPOROSIS, UNSPECIFIED OSTEOPOROSIS TYPE, UNSPECIFIED PATHOLOGICAL FRACTURE PRESENCE: ICD-10-CM

## 2020-11-11 PROCEDURE — 99497 ADVNCD CARE PLAN 30 MIN: CPT | Performed by: INTERNAL MEDICINE

## 2020-11-11 PROCEDURE — 99214 OFFICE O/P EST MOD 30 MIN: CPT | Performed by: INTERNAL MEDICINE

## 2020-11-11 PROCEDURE — 1101F PT FALLS ASSESS-DOCD LE1/YR: CPT | Performed by: INTERNAL MEDICINE

## 2020-11-11 PROCEDURE — G0463 HOSPITAL OUTPT CLINIC VISIT: HCPCS | Performed by: INTERNAL MEDICINE

## 2020-11-11 PROCEDURE — G8536 NO DOC ELDER MAL SCRN: HCPCS | Performed by: INTERNAL MEDICINE

## 2020-11-11 PROCEDURE — G8427 DOCREV CUR MEDS BY ELIG CLIN: HCPCS | Performed by: INTERNAL MEDICINE

## 2020-11-11 PROCEDURE — G0439 PPPS, SUBSEQ VISIT: HCPCS | Performed by: INTERNAL MEDICINE

## 2020-11-11 PROCEDURE — G9717 DOC PT DX DEP/BP F/U NT REQ: HCPCS | Performed by: INTERNAL MEDICINE

## 2020-11-11 PROCEDURE — G8421 BMI NOT CALCULATED: HCPCS | Performed by: INTERNAL MEDICINE

## 2020-11-11 PROCEDURE — 1090F PRES/ABSN URINE INCON ASSESS: CPT | Performed by: INTERNAL MEDICINE

## 2020-11-11 RX ORDER — ALENDRONATE SODIUM 70 MG/1
TABLET ORAL
Qty: 12 TAB | Refills: 3 | Status: SHIPPED | OUTPATIENT
Start: 2020-11-11 | End: 2021-07-19

## 2020-11-11 RX ORDER — RIVASTIGMINE 13.3 MG/24H
PATCH, EXTENDED RELEASE TRANSDERMAL
Qty: 90 PATCH | Refills: 1 | Status: SHIPPED | OUTPATIENT
Start: 2020-11-11 | End: 2021-05-05

## 2020-11-11 RX ORDER — MELOXICAM 7.5 MG/1
TABLET ORAL
Qty: 90 TAB | Refills: 0 | Status: SHIPPED | OUTPATIENT
Start: 2020-11-11 | End: 2021-05-05

## 2020-11-11 RX ORDER — MEMANTINE HYDROCHLORIDE 10 MG/1
10 TABLET ORAL DAILY
Qty: 90 TAB | Refills: 1 | Status: SHIPPED | OUTPATIENT
Start: 2020-11-11 | End: 2021-05-05

## 2020-11-11 RX ORDER — AMLODIPINE AND BENAZEPRIL HYDROCHLORIDE 5; 20 MG/1; MG/1
CAPSULE ORAL
Qty: 90 CAP | Refills: 1 | Status: SHIPPED | OUTPATIENT
Start: 2020-11-11 | End: 2021-05-05

## 2020-11-11 NOTE — PATIENT INSTRUCTIONS
Medicare Wellness Visit, Female     The best way to live healthy is to have a lifestyle where you eat a well-balanced diet, exercise regularly, limit alcohol use, and quit all forms of tobacco/nicotine, if applicable. Regular preventive services are another way to keep healthy. Preventive services (vaccines, screening tests, monitoring & exams) can help personalize your care plan, which helps you manage your own care. Screening tests can find health problems at the earliest stages, when they are easiest to treat. [facility] follows the current, evidence-based guidelines published by the [facility]ai [facility] ([facility]) when recommending preventive services for our patients. Because we follow these guidelines, sometimes recommendations change over time as research supports it. (For example, mammograms used to be recommended annually. Even though Medicare will still pay for an annual mammogram, the newer guidelines recommend a mammogram every two years for women of average risk). Of course, you and your doctor may decide to screen more often for some diseases, based on your risk and your co-morbidities (chronic disease you are already diagnosed with). Preventive services for you include:  - Medicare offers their members a free annual wellness visit, which is time for you and your primary care provider to discuss and plan for your preventive service needs. Take advantage of this benefit every year!  -All adults over the age of 72 should receive the recommended pneumonia vaccines. Current USPSTF guidelines recommend a series of two vaccines for the best pneumonia protection.   -All adults should have a flu vaccine yearly and a tetanus vaccine every 10 years.   -All adults age 48 and older should receive the shingles vaccines (series of two vaccines).       -All adults age 38-68 who are overweight should have a diabetes screening test once every three years.   -All adults born between [birth year] and 1965 should be screened once for Hepatitis C.  -Other screening tests and preventive services for persons with diabetes include: an eye exam to screen for diabetic retinopathy, a kidney function test, a foot exam, and stricter control over your cholesterol.   -Cardiovascular screening for adults with routine risk involves an electrocardiogram (ECG) at intervals determined by your doctor.   -Colorectal cancer screenings should be done for adults age 54-65 with no increased risk factors for colorectal cancer. There are a number of acceptable methods of screening for this type of cancer. Each test has its own benefits and drawbacks. Discuss with your doctor what is most appropriate for you during your annual wellness visit. The different tests include: colonoscopy (considered the best screening method), a fecal occult blood test, a fecal DNA test, and sigmoidoscopy.    -A bone mass density test is recommended when a woman turns 65 to screen for osteoporosis. This test is only recommended one time, as a screening. Some providers will use this same test as a disease monitoring tool if you already have osteoporosis. -Breast cancer screenings are recommended every other year for women of normal risk, age 54-69.  -Cervical cancer screenings for women over age 72 are only recommended with certain risk factors.      Here is a list of your current Health Maintenance items (your personalized list of preventive services) with a due date:  Health Maintenance Due   Topic Date Due    DTaP/Tdap/Td  (1 - Tdap) 02/03/1954    Shingles Vaccine (1 of 2) 02/03/1983    Glaucoma Screening   02/03/1998    Pneumococcal Vaccine (2 of 2 - PPSV23) 10/16/2018    Yearly Flu Vaccine (1) 09/01/2020

## 2020-11-11 NOTE — PROGRESS NOTES
Amie Husbands is a 80 y.o. female who was seen by synchronous (real-time) audio-video technology on 11/11/2020 for Annual Wellness Visit; Hypertension; and Dementia        Assessment & Plan:   Diagnoses and all orders for this visit:    1. Essential hypertension    Stable blood pressure. Will refill,  -     amLODIPine-benazepril (LOTREL) 5-20 mg per capsule; TAKE 1 CAPSULE BY MOUTH EVERY DAY  -     CBC WITH AUTOMATED DIFF  -     METABOLIC PANEL, COMPREHENSIVE    2. Late onset Alzheimer's disease without behavioral disturbance (HCC)    Stable. On Namenda and Exelon patch. Homebound. -     METABOLIC PANEL, COMPREHENSIVE    3. Osteoporosis, unspecified osteoporosis type, unspecified pathological fracture presence    Need to repeat bone density. Will refill,  -     alendronate (FOSAMAX) 70 mg tablet; TAKE 1 TABLET BY MOUTH EVERY SUNDAY  -     DEXA BONE DENSITY STUDY AXIAL; Future    4. Medicare annual wellness visit, subsequent    5. ACP (advance care planning)    6. Dementia without behavioral disturbance, unspecified dementia type (HCC)  -     memantine (NAMENDA) 10 mg tablet; Take 1 Tab by mouth daily. -     rivastigmine (EXELON) 13.3 mg/24 hour patch; APPLY 1 PATCH EXTERNALLY TO THE SKIN EVERY DAY    7. Sundowning  Not agitated. Taking lorazepam as needed. 8. Vitamin D deficiency  -     VITAMIN D, 25 HYDROXY    9. Osteoarthritis of knee, unspecified laterality, unspecified osteoarthritis type  -     meloxicam (MOBIC) 7.5 mg tablet; TAKE 1 TABLET BY MOUTH EVERY DAY        I spent at least 25 minutes on this visit with this established patient. Subjective:     Ms. Carlos Lazar is here for follow-up. She is accompanied by her granddaughter. Has advanced dementia, sundowning seems to stable. Not agitated. Taking Namenda and Exelon patch. Need refill. Has hypertension, compliant with medicine. Denies chest pain palpitation or shortness of breath. Has severe osteoporosis, on Fosamax. Need refill.   Need to repeat bone density. Needs flu shot. Labs reviewed, need new labs. Here for Medicare wellness visit. Has a living will. Prior to Admission medications    Medication Sig Start Date End Date Taking? Authorizing Provider   memantine (NAMENDA) 10 mg tablet Take 1 Tab by mouth daily. 11/11/20  Yes Orlando Powers MD   amLODIPine-benazepril (LOTREL) 5-20 mg per capsule TAKE 1 CAPSULE BY MOUTH EVERY DAY 11/11/20  Yes Orlando Powers MD   alendronate (FOSAMAX) 70 mg tablet TAKE 1 TABLET BY MOUTH EVERY SUNDAY 11/11/20  Yes Orlando Powers MD   rivastigmine (EXELON) 13.3 mg/24 hour patch APPLY 1 PATCH EXTERNALLY TO THE SKIN EVERY DAY 11/11/20  Yes Orlando Powers MD   meloxicam (MOBIC) 7.5 mg tablet TAKE 1 TABLET BY MOUTH EVERY DAY 11/11/20  Yes Orlando Powers MD   triamcinolone acetonide (KENALOG) 0.5 % ointment Apply  to affected area two (2) times a day. use thin layer 10/16/18  Yes Olga Leonard NP   LORazepam (ATIVAN) 0.5 mg tablet Take 1 Tab by mouth nightly as needed for Anxiety. Max Daily Amount: 0.5 mg. 4/17/17  Yes Orlando Powers MD   CALCIUM 500 WITH D 500 mg(1,250mg) -400 unit tab TAKE 1 TABLET BY MOUTH EVERY DAY 7/14/15  Yes Orlando Powers MD   acetaminophen (TYLENOL ARTHRITIS PAIN) 650 mg CR tablet Take 1 Tab by mouth two (2) times daily as needed for Pain.  7/8/15  Yes Orlando Powers MD   amLODIPine-benazepril (LOTREL) 5-20 mg per capsule TAKE 1 CAPSULE BY MOUTH EVERY DAY 6/2/20 11/11/20  Orlando Powers MD   rivastigmine (EXELON) 13.3 mg/24 hour patch APPLY 1 PATCH EXTERNALLY TO THE SKIN EVERY DAY 4/17/20 11/11/20  Sharda Valadez NP   memantine (NAMENDA) 10 mg tablet TAKE 1 TABLET BY MOUTH EVERY DAY 4/7/20 11/11/20  Orlando Powers MD   meloxicam (MOBIC) 7.5 mg tablet TAKE 1 TABLET BY MOUTH EVERY DAY 1/9/20 11/11/20  Orlando Powers MD   QUEtiapine (SEROQUEL) 25 mg tablet TAKE half  TABLET BY MOUTH EVERY NIGHT 10/14/19 11/11/20  Orlando Powers MD   alendronate (FOSAMAX) 70 mg tablet TAKE 1 TABLET BY MOUTH EVERY SUNDAY 10/14/19 11/11/20  Celia Wang MD     Past Medical History:   Diagnosis Date    Arthritis     back and knees    Dementia ([facility] [address] 2/4/2013    mild    Hypercholesterolemia     Hypertension     Other ill-defined conditions(799.89)     elevated cholesterol    PUD (peptic ulcer disease)     Stroke ([facility]     tia    TIA (transient ischaemic attack)        ROS negative    Objective:   No flowsheet data found. Constitutional: [x] Appears well-developed and well-nourished [x] No apparent distress      [] Abnormal -     Mental status: [x] Alert and awake  [x] Oriented to person/place/time [x] Able to follow commands    [] Abnormal -   -     HENT: [x] Normocephalic, atraumatic  [] Abnormal -   [x] Mouth/Throat: Mucous membranes are moist    External Ears [x] Normal  [] Abnormal -    Neck: [x] No visualized mass [] Abnormal -     Pulmonary/Chest: [x] Respiratory effort normal   [x] No visualized signs of difficulty breathing or respiratory distress        [] Abnormal -      Musculoskeletal:   [x] Normal gait with no signs of ataxia         [x] Normal range of motion of neck        [] Abnormal -     Neurological:        [x] No Facial Asymmetry (Cranial nerve 7 motor function) (limited exam due to video visit)          [x] No gaze palsy        [] Abnormal -                   Psychiatric:       [x] Normal Affect [] Abnormal -        [x] No Hallucinations   dementia present. Other pertinent observable physical exam findings:-        We discussed the expected course, resolution and complications of the diagnosis(es) in detail. Medication risks, benefits, costs, interactions, and alternatives were discussed as indicated. I advised her to contact the office if her condition worsens, changes or fails to improve as anticipated. She expressed understanding with the diagnosis(es) and plan.        Ambrose Mohs, who was evaluated through a patient-initiated, synchronous (real-time) audio-video encounter, and/or her healthcare decision maker, is aware that it is a billable service, with coverage as determined by her insurance carrier. She provided verbal consent to proceed: Yes, and patient identification was verified. It was conducted pursuant to the emergency declaration under the [address] [place] [address], [address] [place] [address] authority and the [employer] General Act. A caregiver was present when appropriate. Ability to conduct physical exam was limited. I was in the office. The patient was at home.       Allison Gonzalez MD

## 2020-11-11 NOTE — PROGRESS NOTES
This is the Subsequent Medicare Annual Wellness Exam, performed 12 months or more after the Initial AWV or the last Subsequent AWV    I have reviewed the patient's medical history in detail and updated the computerized patient record. Depression Risk Factor Screening:     3 most recent PHQ Screens 10/14/2019   Little interest or pleasure in doing things Not at all   Feeling down, depressed, irritable, or hopeless Not at all   Total Score PHQ 2 0       Alcohol Risk Screen   Do you average more than 1 drink per night or more than 7 drinks a week:  No    On any one occasion in the past three months have you have had more than 3 drinks containing alcohol:  No        Functional Ability and Level of Safety:   Hearing: Hearing is good. Activities of Daily Living: The home contains: cane  Patient needs help with:  transportation, shopping, preparing meals, laundry, housework, managing medications, managing money, eating and bathing     Ambulation: with mild difficulty     Fall Risk:  Fall Risk Assessment, last 12 mths 10/14/2019   Able to walk? Yes   Fall in past 12 months?  No     Abuse Screen:  Patient is not abused       Cognitive Screening   Has your family/caregiver stated any concerns about your memory: yes - demented    Cognitive Screening: Abnormal - MMSE (Mini Mental Status Exam)    Assessment/Plan   Education and counseling provided:  Are appropriate based on today's review and evaluation  End-of-Life planning (with patient's consent)  Bone mass measurement (DEXA)  Screening for glaucoma        Health Maintenance Due     Health Maintenance Due   Topic Date Due    DTaP/Tdap/Td series (1 - Tdap) 02/03/1954    Shingrix Vaccine Age 50> (1 of 2) 02/03/1983    GLAUCOMA SCREENING Q2Y  02/03/1998    Pneumococcal 65+ years (2 of 2 - PPSV23) 10/16/2018    Flu Vaccine (1) 09/01/2020       Patient Care Team   Patient Care Team:  Susan Zuniga MD as PCP - General (Internal Medicine)  Susan Zuniga MD as PCP - REHABILITATION [facility] Empaneled Provider  Angela Haas RN as Ambulatory Care Manager  Royal Miller, RN as Nurse Navigator  Nadia Liao as Physician (Psychology)    History     Patient Active Problem List   Diagnosis Code    Hypertension I10    Hypercholesterolemia E78.00    DJD (degenerative joint disease) of knee M17.10    Dementia ([facility] F03.90    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 ([facility] F02.80    Depressive disorder, not elsewhere classified F32.9    Anxiety state, unspecified F41.1    Moderate dementia ([address] F03.90    Advance care planning Z71.89     Past Medical History:   Diagnosis Date    Arthritis     back and knees    Dementia ([facility] 2/4/2013    mild    Hypercholesterolemia     Hypertension     Other ill-defined conditions(799.89)     elevated cholesterol    PUD (peptic ulcer disease)     Stroke ([facility] [address] [facility]     tia    TIA (transient ischaemic attack)       Past Surgical History:   Procedure Laterality Date    CARDIAC CATHETERIZATION  10/1990    normal    COLONOSCOPY  [id number]    diverticuli, hemorrhoids    EGD      x 3    EGD  [id number]    gastritis, duodenitis    HX CATARACT REMOVAL      bilateral    HX GYN      BTL    HX HEENT      tonsillectomy    HX ORTHOPAEDIC  2/2011    right knee replacement- Dr. Oro Serum    HX ORTHOPAEDIC  12/10/12     LEFT TOTAL KNEE ARTHROPLASTY  WITH NAVIGATION (EPI W SPINAL ANES).     HX OTHER SURGICAL  02/2002    removal of meningioma    HX OTHER SURGICAL      colonoscopy/EGD    NEUROLOGICAL PROCEDURE UNLISTED      brain tumor resection yrs back     Current Outpatient Medications   Medication Sig Dispense Refill    amLODIPine-benazepril (LOTREL) 5-20 mg per capsule TAKE 1 CAPSULE BY MOUTH EVERY DAY 90 Cap 1    rivastigmine (EXELON) 13.3 mg/24 hour patch APPLY 1 PATCH EXTERNALLY TO THE SKIN EVERY DAY 90 Patch 1    memantine (NAMENDA) 10 mg tablet TAKE 1 TABLET BY MOUTH EVERY DAY 90 Tab 0    meloxicam (MOBIC) 7.5 mg tablet TAKE 1 TABLET BY MOUTH EVERY DAY 90 Tab 0    QUEtiapine (SEROQUEL) 25 mg tablet TAKE half  TABLET BY MOUTH EVERY NIGHT 45 Tab 1    alendronate (FOSAMAX) 70 mg tablet TAKE 1 TABLET BY MOUTH EVERY SUNDAY 28 Tab 11    triamcinolone acetonide (KENALOG) 0.5 % ointment Apply  to affected area two (2) times a day. use thin layer 30 g 0    LORazepam (ATIVAN) 0.5 mg tablet Take 1 Tab by mouth nightly as needed for Anxiety. Max Daily Amount: 0.5 mg. 30 Tab 0    CALCIUM 500 WITH D 500 mg(1,250mg) -400 unit tab TAKE 1 TABLET BY MOUTH EVERY DAY 30 Tab 0    acetaminophen (TYLENOL ARTHRITIS PAIN) 650 mg CR tablet Take 1 Tab by mouth two (2) times daily as needed for Pain. 60 Tab 3     Allergies   Allergen Reactions    Pcn [Penicillins] Itching       Family History   Problem Relation Age of Onset    Hypertension Mother    [facility] Elevated Lipids Mother     Dementia Mother     Elevated Lipids Sister     Dementia Sister     Cancer Brother         colon    No Known Problems Child     No Known Problems Child     No Known Problems Child     No Known Problems Child      Social History     Tobacco Use    Smoking status: Never Smoker    Smokeless tobacco: Never Used   Substance Use Topics    Alcohol use: No       Valeria Blancaser, who was evaluated through a synchronous (real-time) audio-video encounter, and/or her healthcare decision maker, is aware that it is a billable service, with coverage as determined by her insurance carrier. She provided verbal consent to proceed: Yes, and patient identification was verified. It was conducted pursuant to the emergency declaration under the [address] [place] [address], [address] [place] [address] and the [employer] General Act. A caregiver was present when appropriate. Ability to conduct physical exam was limited. I was in the office. The patient was at home.     Callie Cope MD

## 2020-11-11 NOTE — ACP (ADVANCE CARE PLANNING)

## 2020-12-11 ENCOUNTER — VIRTUAL VISIT (OUTPATIENT)
Dept: INTERNAL MEDICINE CLINIC | Age: 85
End: 2020-12-11
Payer: MEDICARE

## 2020-12-11 DIAGNOSIS — F02.80 LATE ONSET ALZHEIMER'S DISEASE WITHOUT BEHAVIORAL DISTURBANCE (HCC): ICD-10-CM

## 2020-12-11 DIAGNOSIS — B35.9 TINEA: Primary | ICD-10-CM

## 2020-12-11 DIAGNOSIS — M17.0 PRIMARY OSTEOARTHRITIS OF BOTH KNEES: ICD-10-CM

## 2020-12-11 DIAGNOSIS — I10 ESSENTIAL HYPERTENSION: ICD-10-CM

## 2020-12-11 DIAGNOSIS — G30.1 LATE ONSET ALZHEIMER'S DISEASE WITHOUT BEHAVIORAL DISTURBANCE (HCC): ICD-10-CM

## 2020-12-11 PROCEDURE — G8421 BMI NOT CALCULATED: HCPCS | Performed by: INTERNAL MEDICINE

## 2020-12-11 PROCEDURE — 1101F PT FALLS ASSESS-DOCD LE1/YR: CPT | Performed by: INTERNAL MEDICINE

## 2020-12-11 PROCEDURE — G9717 DOC PT DX DEP/BP F/U NT REQ: HCPCS | Performed by: INTERNAL MEDICINE

## 2020-12-11 PROCEDURE — 99214 OFFICE O/P EST MOD 30 MIN: CPT | Performed by: INTERNAL MEDICINE

## 2020-12-11 PROCEDURE — 1090F PRES/ABSN URINE INCON ASSESS: CPT | Performed by: INTERNAL MEDICINE

## 2020-12-11 PROCEDURE — G0463 HOSPITAL OUTPT CLINIC VISIT: HCPCS | Performed by: INTERNAL MEDICINE

## 2020-12-11 PROCEDURE — G8427 DOCREV CUR MEDS BY ELIG CLIN: HCPCS | Performed by: INTERNAL MEDICINE

## 2020-12-11 PROCEDURE — G8536 NO DOC ELDER MAL SCRN: HCPCS | Performed by: INTERNAL MEDICINE

## 2020-12-11 RX ORDER — CLOTRIMAZOLE AND BETAMETHASONE DIPROPIONATE 10; .64 MG/G; MG/G
CREAM TOPICAL 2 TIMES DAILY
Qty: 15 G | Refills: 0 | Status: SHIPPED | OUTPATIENT
Start: 2020-12-11 | End: 2021-11-26 | Stop reason: ALTCHOICE

## 2020-12-11 NOTE — PROGRESS NOTES
Health Maintenance Due   Topic Date Due    DTaP/Tdap/Td series (1 - Tdap) 02/03/1954    Shingrix Vaccine Age 50> (1 of 2) 02/03/1983    GLAUCOMA SCREENING Q2Y  02/03/1998    Pneumococcal 65+ years (2 of 2 - PPSV23) 10/16/2018    Flu Vaccine (1) 09/01/2020       Chief Complaint   Patient presents with    Rash     wrist right only circular rash       1. Have you been to the ER, urgent care clinic since your last visit? Hospitalized since your last visit? No    2. Have you seen or consulted any other health care providers outside of the [address] [place] [address] since your last visit? Include any pap smears or colon screening. No    3) Do you have an Advance Directive on file? no    4) Are you interested in receiving information on Advance Directives? NO      Patient is accompanied by son I have received verbal consent from Ruth Willis to discuss any/all medical information while they are present in the room.

## 2020-12-11 NOTE — PROGRESS NOTES
Haim Enriquez is a 80 y.o. female who was seen by synchronous (real-time) audio-video technology on 12/11/2020 for Rash (wrist right only circular rash) and Knee Pain        Assessment & Plan:   Diagnoses and all orders for this visit:    1. Tinea    Rash on forearm. Will call in,  -     clotrimazole-betamethasone (LOTRISONE) topical cream; Apply  to affected area two (2) times a day. 2. Late onset Alzheimer's disease without behavioral disturbance ([address]  Advanced dementia, she is on multiple medications including Namenda and Exelon patch. Dementia is slowly getting worse. 3. Essential hypertension  Stable blood pressure. On Lotrel. 4. Primary osteoarthritis of both knees  Taking Tylenol as needed. Also using meloxicam.  Pain is tolerable. I spent at least 25 minutes on this visit with this established patient. Subjective:     Ms. Gabriela Haines is here here with her son. She has advanced dementia. Noticed to have rash on her right forearm near the wrist area, slightly itchy. Has hypertension, compliant with medicine. Denies chest pain palpitation or shortness of breath. Has advanced DJD in the both knee. Taking meloxicam which is helping her. Memory is progressively getting worse. Son mentioned that with the last visit they were upset with the nurse who asked him a lot of question, nurse was not polite and and she did not understand that patient has advanced dementia. I found out as a float nurse Ms. Perkins  was here, I apologize the family that will talk to the nurse and make sure he does not happen again. All labs reviewed, no recent labs done, patient is staying at home, not willing to get outside for labs. Did not get flu shot. Prior to Admission medications    Medication Sig Start Date End Date Taking? Authorizing Provider   clotrimazole-betamethasone (LOTRISONE) topical cream Apply  to affected area two (2) times a day.  12/11/20  Yes Kathleen Clark MD   memantine [facility]) 10 mg tablet Take 1 Tab by mouth daily. 11/11/20  Yes Roberto Yuan MD   amLODIPine-benazepril (LOTREL) 5-20 mg per capsule TAKE 1 CAPSULE BY MOUTH EVERY DAY 11/11/20  Yes Roberto Yuan MD   alendronate (FOSAMAX) 70 mg tablet TAKE 1 TABLET BY MOUTH EVERY SUNDAY 11/11/20  Yes Roberto Yuan MD   rivastigmine (EXELON) 13.3 mg/24 hour patch APPLY 1 PATCH EXTERNALLY TO THE SKIN EVERY DAY 11/11/20  Yes Roberto Yuan MD   meloxicam (MOBIC) 7.5 mg tablet TAKE 1 TABLET BY MOUTH EVERY DAY 11/11/20  Yes Roberto Yuan MD   triamcinolone acetonide (KENALOG) 0.5 % ointment Apply  to affected area two (2) times a day. use thin layer 10/16/18  Yes Olga Leonard NP   LORazepam (ATIVAN) 0.5 mg tablet Take 1 Tab by mouth nightly as needed for Anxiety. Max Daily Amount: 0.5 mg. 4/17/17  Yes Roberto Yuan MD   CALCIUM 500 WITH D 500 mg(1,250mg) -400 unit tab TAKE 1 TABLET BY MOUTH EVERY DAY 7/14/15  Yes Roberto Yuan MD   acetaminophen (TYLENOL ARTHRITIS PAIN) 650 mg CR tablet Take 1 Tab by mouth two (2) times daily as needed for Pain. 7/8/15  Yes Roberto Yuan MD     Past Medical History:   Diagnosis Date    Arthritis     back and knees    Dementia ([facility] 2/4/2013    mild    Hypercholesterolemia     Hypertension     Other ill-defined conditions(799.89)     elevated cholesterol    PUD (peptic ulcer disease)     Stroke ([facility]     tia    TIA (transient ischaemic attack)        ROS significant for rash. Objective:   No flowsheet data found.      Constitutional: [x] Appears well-developed and well-nourished [x] No apparent distress      [] Abnormal -     Mental status: [x] Alert and awake  [x] Oriented to person/place/time [x] Able to follow commands    [] Abnormal -     HENT: [x] Normocephalic, atraumatic  [] Abnormal -   [x] Mouth/Throat: Mucous membranes are moist    External Ears [x] Normal  [] Abnormal -    Neck: [x] No visualized mass [] Abnormal -     Pulmonary/Chest: [x] Respiratory effort normal   [x] No visualized signs of difficulty breathing or respiratory distress        [] Abnormal -      Musculoskeletal:   [x] Normal gait with no signs of ataxia         [x] Normal range of motion of neck        [] Abnormal -     Neurological:        [x] No Facial Asymmetry (Cranial nerve 7 motor function) (limited exam due to video visit)          [x] No gaze palsy        [] Abnormal -          Skin:   Right forearm: Approximately 3 cm diameter oval-shaped rash with central clearing and elevated border, slightly itchy. Psychiatric:       [x] Normal Affect [] Abnormal -   Advanced dementia present. [x] No Hallucinations    Other pertinent observable physical exam findings:-        We discussed the expected course, resolution and complications of the diagnosis(es) in detail. Medication risks, benefits, costs, interactions, and alternatives were discussed as indicated. I advised her to contact the office if her condition worsens, changes or fails to improve as anticipated. She expressed understanding with the diagnosis(es) and plan. Esdras De, who was evaluated through a patient-initiated, synchronous (real-time) audio-video encounter, and/or her healthcare decision maker, is aware that it is a billable service, with coverage as determined by her insurance carrier. She provided verbal consent to proceed: Yes, and patient identification was verified. It was conducted pursuant to the emergency declaration under the [address] [place] [address] authority and the [employer] General Act. A caregiver was present when appropriate. Ability to conduct physical exam was limited. I was in the office. The patient was at home.       Tatiana Merritt MD

## 2021-05-05 DIAGNOSIS — F03.90 DEMENTIA WITHOUT BEHAVIORAL DISTURBANCE, UNSPECIFIED DEMENTIA TYPE: ICD-10-CM

## 2021-05-05 DIAGNOSIS — M17.9 OSTEOARTHRITIS OF KNEE, UNSPECIFIED LATERALITY, UNSPECIFIED OSTEOARTHRITIS TYPE: ICD-10-CM

## 2021-05-05 DIAGNOSIS — I10 ESSENTIAL HYPERTENSION: ICD-10-CM

## 2021-05-05 RX ORDER — AMLODIPINE AND BENAZEPRIL HYDROCHLORIDE 5; 20 MG/1; MG/1
CAPSULE ORAL
Qty: 90 CAP | Refills: 1 | Status: SHIPPED | OUTPATIENT
Start: 2021-05-05 | End: 2021-11-01

## 2021-05-05 RX ORDER — RIVASTIGMINE 13.3 MG/24H
PATCH, EXTENDED RELEASE TRANSDERMAL
Qty: 90 PATCH | Refills: 1 | Status: SHIPPED | OUTPATIENT
Start: 2021-05-05 | End: 2021-11-26 | Stop reason: SDUPTHER

## 2021-05-05 RX ORDER — MEMANTINE HYDROCHLORIDE 10 MG/1
TABLET ORAL
Qty: 90 TAB | Refills: 1 | Status: SHIPPED | OUTPATIENT
Start: 2021-05-05 | End: 2021-11-01

## 2021-05-05 RX ORDER — MELOXICAM 7.5 MG/1
TABLET ORAL
Qty: 90 TAB | Refills: 0 | Status: SHIPPED | OUTPATIENT
Start: 2021-05-05 | End: 2021-08-03

## 2021-05-05 RX ORDER — RIVASTIGMINE 13.3 MG/24H
PATCH, EXTENDED RELEASE TRANSDERMAL
Qty: 90 PATCH | Refills: 1 | Status: SHIPPED | OUTPATIENT
Start: 2021-05-05 | End: 2021-05-05

## 2021-05-19 ENCOUNTER — VIRTUAL VISIT (OUTPATIENT)
Dept: INTERNAL MEDICINE CLINIC | Age: 86
End: 2021-05-19
Payer: MEDICARE

## 2021-05-19 DIAGNOSIS — G47.00 INSOMNIA, UNSPECIFIED TYPE: ICD-10-CM

## 2021-05-19 DIAGNOSIS — E78.00 HYPERCHOLESTEROLEMIA: ICD-10-CM

## 2021-05-19 DIAGNOSIS — E55.9 VITAMIN D DEFICIENCY: ICD-10-CM

## 2021-05-19 DIAGNOSIS — M81.0 OSTEOPOROSIS, UNSPECIFIED OSTEOPOROSIS TYPE, UNSPECIFIED PATHOLOGICAL FRACTURE PRESENCE: ICD-10-CM

## 2021-05-19 DIAGNOSIS — F02.80 DEMENTIA DUE TO MEDICAL CONDITION WITHOUT BEHAVIORAL DISTURBANCE (HCC): ICD-10-CM

## 2021-05-19 DIAGNOSIS — I10 ESSENTIAL HYPERTENSION: Primary | ICD-10-CM

## 2021-05-19 PROCEDURE — G8536 NO DOC ELDER MAL SCRN: HCPCS | Performed by: INTERNAL MEDICINE

## 2021-05-19 PROCEDURE — G8421 BMI NOT CALCULATED: HCPCS | Performed by: INTERNAL MEDICINE

## 2021-05-19 PROCEDURE — G8427 DOCREV CUR MEDS BY ELIG CLIN: HCPCS | Performed by: INTERNAL MEDICINE

## 2021-05-19 PROCEDURE — 1090F PRES/ABSN URINE INCON ASSESS: CPT | Performed by: INTERNAL MEDICINE

## 2021-05-19 PROCEDURE — 1101F PT FALLS ASSESS-DOCD LE1/YR: CPT | Performed by: INTERNAL MEDICINE

## 2021-05-19 PROCEDURE — 99214 OFFICE O/P EST MOD 30 MIN: CPT | Performed by: INTERNAL MEDICINE

## 2021-05-19 PROCEDURE — G9717 DOC PT DX DEP/BP F/U NT REQ: HCPCS | Performed by: INTERNAL MEDICINE

## 2021-05-19 PROCEDURE — G0463 HOSPITAL OUTPT CLINIC VISIT: HCPCS | Performed by: INTERNAL MEDICINE

## 2021-05-19 RX ORDER — QUETIAPINE FUMARATE 25 MG/1
12.5 TABLET, FILM COATED ORAL
Qty: 45 TABLET | Refills: 1 | Status: SHIPPED | OUTPATIENT
Start: 2021-05-19 | End: 2021-08-23

## 2021-05-19 RX ORDER — CALCIUM CARBONATE/VITAMIN D3 500 MG-10
TABLET ORAL
Qty: 90 TABLET | Refills: 3 | Status: SHIPPED | OUTPATIENT
Start: 2021-05-19

## 2021-05-19 NOTE — PROGRESS NOTES
Nyoka Bosworth is a 80 y.o. female who was seen by synchronous (real-time) audio-video technology on 5/19/2021 for Dementia, Insomnia, and Knee Pain        Assessment & Plan:   Diagnoses and all orders for this visit:    1. Essential hypertension    Stable blood pressure. She is on Lotrel. Will check,  -     CBC WITH AUTOMATED DIFF; Future  -     METABOLIC PANEL, COMPREHENSIVE; Future  -     LIPID PANEL; Future    2. Osteoporosis, unspecified osteoporosis type, unspecified pathological fracture presence    Taking Fosamax once a week. She does not take any calcium supplement. We will add,  -     Calcium-Cholecalciferol, D3, (Calcium 500 With D) 500 mg(1,250mg) -400 unit tab; 1 tab po q Am  -     METABOLIC PANEL, COMPREHENSIVE; Future  -     VITAMIN D, 25 HYDROXY; Future    We will order,  -     DEXA BONE DENSITY STUDY AXIAL; Future    3. Hypercholesterolemia  -     METABOLIC PANEL, COMPREHENSIVE; Future  -     LIPID PANEL; Future    4. 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    Is progressively going downhill. As she is on Exelon patch and Namenda. Son is the caregiver. Patient is staying with her niece. Had some sundowning some nights. Not able to sleep well at night. We will add Seroquel.  -     CBC WITH AUTOMATED DIFF; Future  -     METABOLIC PANEL, COMPREHENSIVE; Future  -     QUEtiapine (SEROquel) 25 mg tablet; Take 0.5 Tablets by mouth nightly. 5. Vitamin D deficiency  -     VITAMIN D, 25 HYDROXY; Future    6. Insomnia, unspecified type    We will give,  -     QUEtiapine (SEROquel) 25 mg tablet; Take 0.5 Tablets by mouth nightly. I spent at least 30 minutes on this visit with this established patient. Subjective:     Laisha is here for follow-up. She is accompanied by her son. She is staying home most of the time. Has advanced dementia. According to son her dementia is going downhill.   She is compliant with medications but sometimes she also sundowning at night.  She is not able to sleep well at night. She is still recognizing them, trying to eat 3 meals a day but sometimes she forgets. Has hypertension, compliant with medicine. Denies chest pain palpitation shortness of breath. She has osteoporosis, on Fosamax once a week. Not on calcium supplement. Need bone density. Insomnia is a big problem. She is not very active, so does nap during daytime. Nighttime she has difficulty falling asleep. She sometimes get agitated at night. Prior to Admission medications    Medication Sig Start Date End Date Taking? Authorizing Provider   Calcium-Cholecalciferol, D3, (Calcium 500 With D) 500 mg(1,250mg) -400 unit tab 1 tab po q Am 5/19/21  Yes Callie Brody MD   QUEtiapine (SEROquel) 25 mg tablet Take 0.5 Tablets by mouth nightly. 5/19/21  Yes Callie Brody MD   memantine (NAMENDA) 10 mg tablet TAKE 1 TABLET BY MOUTH DAILY 5/5/21  Yes Callie Brody MD   amLODIPine-benazepril (LOTREL) 5-20 mg per capsule TAKE 1 CAPSULE BY MOUTH EVERY DAY 5/5/21  Yes Callie Brody MD   meloxicam (MOBIC) 7.5 mg tablet TAKE 1 TABLET BY MOUTH EVERY DAY 5/5/21  Yes Callie Brody MD   rivastigmine (EXELON) 13.3 mg/24 hour patch APPLY 1 PATCH EXTERNALLY TO THE SKIN EVERY DAY 5/5/21  Yes Trini Causey NP   clotrimazole-betamethasone (LOTRISONE) topical cream Apply  to affected area two (2) times a day. 12/11/20  Yes Callie Brody MD   alendronate (FOSAMAX) 70 mg tablet TAKE 1 TABLET BY MOUTH EVERY SUNDAY 11/11/20  Yes Callie Brody MD   triamcinolone acetonide (KENALOG) 0.5 % ointment Apply  to affected area two (2) times a day. use thin layer 10/16/18  Yes Olga Leonard NP   LORazepam (ATIVAN) 0.5 mg tablet Take 1 Tab by mouth nightly as needed for Anxiety. Max Daily Amount: 0.5 mg. 4/17/17  Yes Callie Brody MD   acetaminophen (TYLENOL ARTHRITIS PAIN) 650 mg CR tablet Take 1 Tab by mouth two (2) times daily as needed for Pain.  7/8/15  Yes Callie Brody MD   CALCIUM 500 WITH D 500 mg(1,250mg) -400 unit tab TAKE 1 TABLET BY MOUTH EVERY DAY 7/14/15 5/19/21  Didier Palomares MD     Past Medical History:   Diagnosis Date    Arthritis     back and knees    Dementia ([facility] 2/4/2013    mild    Hypercholesterolemia     Hypertension     Other ill-defined conditions(799.89)     elevated cholesterol    PUD (peptic ulcer disease)     Stroke ([facility] [address]     tia    TIA (transient ischaemic attack)        ROS significant for insomnia, memory loss. Objective:   No flowsheet data found. Constitutional: [x] Appears well-developed and well-nourished [x] No apparent distress      [] Abnormal -     Mental status: [x] Alert and awake  [x] Oriented to person/place/time [x] Able to follow commands    [] Abnormal -       HENT: [x] Normocephalic, atraumatic  [] Abnormal -   [x] Mouth/Throat: Mucous membranes are moist    External Ears [x] Normal  [] Abnormal -    Neck: [x] No visualized mass [] Abnormal -     Pulmonary/Chest: [x] Respiratory effort normal   [x] No visualized signs of difficulty breathing or respiratory distress        [] Abnormal -      Musculoskeletal:   [x] Normal gait with no signs of ataxia         [x] Normal range of motion of neck        [] Abnormal -     Neurological:        [x] No Facial Asymmetry (Cranial nerve 7 motor function) (limited exam due to video visit)          [x] No gaze palsy        [] Abnormal -          Skin:        [x] No significant exanthematous lesions or discoloration noted on facial skin         [] Abnormal -            Psychiatric:       [x] Normal Affect [] Abnormal -   No depression. Advanced dementia present. [x] No Hallucinations    Other pertinent observable physical exam findings:-        We discussed the expected course, resolution and complications of the diagnosis(es) in detail. Medication risks, benefits, costs, interactions, and alternatives were discussed as indicated.   I advised her to contact the office if her condition worsens, changes or fails to improve as anticipated. She expressed understanding with the diagnosis(es) and plan. Nyoka Bosworth, was evaluated through a synchronous (real-time) audio-video encounter. The patient (or guardian if applicable) is aware that this is a billable service. Verbal consent to proceed has been obtained within the past 12 months. The visit was conducted pursuant to the emergency declaration under the [address] [place] [address] and the [employer] General Act. Patient identification was verified, and a caregiver was present when appropriate. The patient was located in a state where the provider was credentialed to provide care.       Christy Guajardo MD

## 2021-05-19 NOTE — PROGRESS NOTES
Results for orders placed or performed in visit on 97/33/60   METABOLIC PANEL, COMPREHENSIVE   Result Value Ref Range    Glucose 87 65 - 99 mg/dL    BUN 12 8 - 27 mg/dL    Creatinine 1.06 (H) 0.57 - 1.00 mg/dL    GFR est non-AA 48 (L) >59 mL/min/1.73    GFR est AA 55 (L) >59 mL/min/1.73    BUN/Creatinine ratio 11 (L) 12 - 28    Sodium 146 (H) 134 - 144 mmol/L    Potassium 4.0 3.5 - 5.2 mmol/L    Chloride 106 96 - 106 mmol/L    CO2 24 20 - 29 mmol/L    Calcium 9.4 8.7 - 10.3 mg/dL    Protein, total 7.3 6.0 - 8.5 g/dL    Albumin 4.2 3.5 - 4.7 g/dL    GLOBULIN, TOTAL 3.1 1.5 - 4.5 g/dL    A-G Ratio 1.4 1.2 - 2.2    Bilirubin, total 0.4 0.0 - 1.2 mg/dL    Alk. phosphatase 89 39 - 117 IU/L    AST (SGOT) 16 0 - 40 IU/L    ALT (SGPT) 10 0 - 32 IU/L   CKD REPORT   Result Value Ref Range    Interpretation Note        Health Maintenance Due   Topic Date Due    COVID-19 Vaccine (1) Never done    DTaP/Tdap/Td series (1 - Tdap) Never done    Shingrix Vaccine Age 50> (1 of 2) Never done    Pneumococcal 65+ years (2 of 2 - PPSV23) 10/16/2018       No chief complaint on file. 1. Have you been to the ER, urgent care clinic since your last visit? Hospitalized since your last visit? No    2. Have you seen or consulted any other health care providers outside of the [address] [place] [address] since your last visit? Include any pap smears or colon screening. No    3) Do you have an Advance Directive on file? no    4) Are you interested in receiving information on Advance Directives? NO      Patient is accompanied by son I have received verbal consent from Giovanni Rodriguez to discuss any/all medical information while they are present in the room.

## 2021-06-14 ENCOUNTER — TELEPHONE (OUTPATIENT)
Dept: INTERNAL MEDICINE CLINIC | Age: 86
End: 2021-06-14

## 2021-06-14 NOTE — TELEPHONE ENCOUNTER
Pt son calling states sleep med pt was given is causing her to have hallucinations. Pt son is requesting a new med to relax her and get her to sleep.  Please send to [facility] on [address]

## 2021-06-19 LAB
25(OH)D3+25(OH)D2 SERPL-MCNC: 35.3 NG/ML (ref 30–100)
ALBUMIN SERPL-MCNC: 3.9 G/DL (ref 3.6–4.6)
ALBUMIN/GLOB SERPL: 1.3 {RATIO} (ref 1.2–2.2)
ALP SERPL-CCNC: 87 IU/L (ref 48–121)
ALT SERPL-CCNC: 13 IU/L (ref 0–32)
AST SERPL-CCNC: 19 IU/L (ref 0–40)
BASOPHILS # BLD AUTO: 0 X10E3/UL (ref 0–0.2)
BASOPHILS NFR BLD AUTO: 1 %
BILIRUB SERPL-MCNC: 0.4 MG/DL (ref 0–1.2)
BUN SERPL-MCNC: 12 MG/DL (ref 8–27)
BUN/CREAT SERPL: 11 (ref 12–28)
CALCIUM SERPL-MCNC: 9.4 MG/DL (ref 8.7–10.3)
CHLORIDE SERPL-SCNC: 105 MMOL/L (ref 96–106)
CHOLEST SERPL-MCNC: 160 MG/DL (ref 100–199)
CO2 SERPL-SCNC: 26 MMOL/L (ref 20–29)
CREAT SERPL-MCNC: 1.14 MG/DL (ref 0.57–1)
EOSINOPHIL # BLD AUTO: 0.3 X10E3/UL (ref 0–0.4)
EOSINOPHIL NFR BLD AUTO: 6 %
ERYTHROCYTE [DISTWIDTH] IN BLOOD BY AUTOMATED COUNT: 15.8 % (ref 11.7–15.4)
GLOBULIN SER CALC-MCNC: 3 G/DL (ref 1.5–4.5)
GLUCOSE SERPL-MCNC: 84 MG/DL (ref 65–99)
HCT VFR BLD AUTO: 40.4 % (ref 34–46.6)
HDLC SERPL-MCNC: 50 MG/DL
HGB BLD-MCNC: 12.7 G/DL (ref 11.1–15.9)
IMM GRANULOCYTES # BLD AUTO: 0 X10E3/UL (ref 0–0.1)
IMM GRANULOCYTES NFR BLD AUTO: 0 %
IMP & REVIEW OF LAB RESULTS: NORMAL
INTERPRETATION: NORMAL
LDLC SERPL CALC-MCNC: 94 MG/DL (ref 0–99)
LYMPHOCYTES # BLD AUTO: 1.6 X10E3/UL (ref 0.7–3.1)
LYMPHOCYTES NFR BLD AUTO: 34 %
MCH RBC QN AUTO: 23.3 PG (ref 26.6–33)
MCHC RBC AUTO-ENTMCNC: 31.4 G/DL (ref 31.5–35.7)
MCV RBC AUTO: 74 FL (ref 79–97)
MONOCYTES # BLD AUTO: 0.4 X10E3/UL (ref 0.1–0.9)
MONOCYTES NFR BLD AUTO: 9 %
NEUTROPHILS # BLD AUTO: 2.3 X10E3/UL (ref 1.4–7)
NEUTROPHILS NFR BLD AUTO: 50 %
PLATELET # BLD AUTO: 234 X10E3/UL (ref 150–450)
POTASSIUM SERPL-SCNC: 4.6 MMOL/L (ref 3.5–5.2)
PROT SERPL-MCNC: 6.9 G/DL (ref 6–8.5)
RBC # BLD AUTO: 5.44 X10E6/UL (ref 3.77–5.28)
SODIUM SERPL-SCNC: 144 MMOL/L (ref 134–144)
TRIGL SERPL-MCNC: 82 MG/DL (ref 0–149)
VLDLC SERPL CALC-MCNC: 16 MG/DL (ref 5–40)
WBC # BLD AUTO: 4.6 X10E3/UL (ref 3.4–10.8)

## 2021-06-23 ENCOUNTER — TRANSCRIBE ORDER (OUTPATIENT)
Dept: REGISTRATION | Age: 86
End: 2021-06-23

## 2021-06-23 ENCOUNTER — VIRTUAL VISIT (OUTPATIENT)
Dept: INTERNAL MEDICINE CLINIC | Age: 86
End: 2021-06-23
Payer: MEDICARE

## 2021-06-23 ENCOUNTER — HOSPITAL ENCOUNTER (OUTPATIENT)
Dept: MAMMOGRAPHY | Age: 86
Discharge: HOME | End: 2021-06-23
Attending: INTERNAL MEDICINE
Payer: MEDICARE

## 2021-06-23 DIAGNOSIS — F05 SUNDOWNING: ICD-10-CM

## 2021-06-23 DIAGNOSIS — G47.00 INSOMNIA, UNSPECIFIED TYPE: Primary | ICD-10-CM

## 2021-06-23 DIAGNOSIS — M81.0 OSTEOPOROSIS: ICD-10-CM

## 2021-06-23 DIAGNOSIS — M81.0 OSTEOPOROSIS, UNSPECIFIED OSTEOPOROSIS TYPE, UNSPECIFIED PATHOLOGICAL FRACTURE PRESENCE: ICD-10-CM

## 2021-06-23 DIAGNOSIS — F03.90 DEMENTIA WITHOUT BEHAVIORAL DISTURBANCE, UNSPECIFIED DEMENTIA TYPE: ICD-10-CM

## 2021-06-23 DIAGNOSIS — M81.0 OSTEOPOROSIS: Primary | ICD-10-CM

## 2021-06-23 PROCEDURE — 1090F PRES/ABSN URINE INCON ASSESS: CPT | Performed by: INTERNAL MEDICINE

## 2021-06-23 PROCEDURE — G9717 DOC PT DX DEP/BP F/U NT REQ: HCPCS | Performed by: INTERNAL MEDICINE

## 2021-06-23 PROCEDURE — G8427 DOCREV CUR MEDS BY ELIG CLIN: HCPCS | Performed by: INTERNAL MEDICINE

## 2021-06-23 PROCEDURE — G0463 HOSPITAL OUTPT CLINIC VISIT: HCPCS | Performed by: INTERNAL MEDICINE

## 2021-06-23 PROCEDURE — 77080 DXA BONE DENSITY AXIAL: CPT

## 2021-06-23 PROCEDURE — G8421 BMI NOT CALCULATED: HCPCS | Performed by: INTERNAL MEDICINE

## 2021-06-23 PROCEDURE — 1101F PT FALLS ASSESS-DOCD LE1/YR: CPT | Performed by: INTERNAL MEDICINE

## 2021-06-23 PROCEDURE — 99214 OFFICE O/P EST MOD 30 MIN: CPT | Performed by: INTERNAL MEDICINE

## 2021-06-23 PROCEDURE — G8536 NO DOC ELDER MAL SCRN: HCPCS | Performed by: INTERNAL MEDICINE

## 2021-06-23 RX ORDER — ALPRAZOLAM 0.25 MG/1
0.25 TABLET ORAL
Qty: 30 TABLET | Refills: 1 | Status: SHIPPED | OUTPATIENT
Start: 2021-06-23

## 2021-06-23 NOTE — PROGRESS NOTES
Erica Miller is a 80 y.o. female who was seen by synchronous (real-time) audio-video technology on 6/23/2021 for Follow-up (Patient not sleeping ,states the need for another medication other than Seroquel), Dementia, and Arthritis        Assessment & Plan:   Diagnoses and all orders for this visit:    1. Insomnia, unspecified type  Seroquel is not helping her to sleep. Will discontinue it. We will start Xanax 0.25 mg 1 tablet every night as needed. #30, 1 refill. Follow-up in 2 months. 2. Dementia without behavioral disturbance, unspecified dementia type ([address]  Advanced dementia, she is with the family. Has sundowning. We will continue Exelon and Namenda. 3. Osteoporosis, unspecified osteoporosis type, unspecified pathological fracture presence  Improvement of bone density. Will continue Fosamax. 4. Sundowning  -     ALPRAZolam (XANAX) 0.25 mg tablet; Take 1 Tablet by mouth nightly as needed for Anxiety. Max Daily Amount: 0.25 mg. ARIADNA seraquel        I spent at least 25 minutes on this visit with this established patient. Subjective:     Laisha is here for follow-up. She is accompanied by her granddaughter. Reported insomnia and sundowning. Seroquel is causing hallucination, she is not able to sleep with Seroquel. Has advanced dementia, on Exelon patch and Namenda, working well. Family is with her to take care for her,  Has hypertension, compliant with medications. Denies chest pain palpitation or shortness of breath. Has DJD, takes meloxicam.  Doing well. Has osteoporosis, bone density done, showed osteoporosis, will continue Fosamax. Prior to Admission medications    Medication Sig Start Date End Date Taking? Authorizing Provider   ALPRAZolam Kev Rebollar) 0.25 mg tablet Take 1 Tablet by mouth nightly as needed for Anxiety.  Max Daily Amount: 0.25 mg. DC seraquel 6/23/21  Yes Montserrat Hedrick MD   Calcium-Cholecalciferol, D3, (Calcium 500 With D) 500 mg(1,250mg) -400 unit tab 1 tab po q Am 5/19/21  Yes Quan Coleman MD   QUEtiapine (SEROquel) 25 mg tablet Take 0.5 Tablets by mouth nightly. 5/19/21  Yes Quan Coleman MD   memantine (NAMENDA) 10 mg tablet TAKE 1 TABLET BY MOUTH DAILY 5/5/21  Yes Quan Coleman MD   amLODIPine-benazepril (LOTREL) 5-20 mg per capsule TAKE 1 CAPSULE BY MOUTH EVERY DAY 5/5/21  Yes Quan Coleman MD   meloxicam (MOBIC) 7.5 mg tablet TAKE 1 TABLET BY MOUTH EVERY DAY 5/5/21  Yes Quan Coleman MD   rivastigmine (EXELON) 13.3 mg/24 hour patch APPLY 1 PATCH EXTERNALLY TO THE SKIN EVERY DAY 5/5/21  Yes Rehan Carvajal NP   clotrimazole-betamethasone (LOTRISONE) topical cream Apply  to affected area two (2) times a day. 12/11/20  Yes Quan Coleman MD   alendronate (FOSAMAX) 70 mg tablet TAKE 1 TABLET BY MOUTH EVERY SUNDAY 11/11/20  Yes Quan Coleman MD   triamcinolone acetonide (KENALOG) 0.5 % ointment Apply  to affected area two (2) times a day. use thin layer 10/16/18  Yes Olga Leonard NP   acetaminophen (TYLENOL ARTHRITIS PAIN) 650 mg CR tablet Take 1 Tab by mouth two (2) times daily as needed for Pain. 7/8/15  Yes Quan Coleman MD   LORazepam (ATIVAN) 0.5 mg tablet Take 1 Tab by mouth nightly as needed for Anxiety. Max Daily Amount: 0.5 mg. 4/17/17 6/23/21  Quan Coleman MD     Past Medical History:   Diagnosis Date    Arthritis     back and knees    D[facility] ([facility] 2/4/2013    mild    Hypercholesterolemia     Hypertension     Other ill-defined conditions(799.89)     elevated cholesterol    PUD (peptic ulcer disease)     Stroke ([facility]     tia    TIA (transient ischaemic attack)        ROS significant for dementia and insomnia.     Objective:     Patient-Reported Vitals 6/23/2021   Patient-Reported Weight 141lb          Constitutional: [x] Appears well-developed and well-nourished [x] No apparent distress      [] Abnormal -     Mental status: [x] Alert and awake  [x] Oriented to person/place/time [x] Able to follow commands    [] Abnormal -       HENT: [x] Normocephalic, atraumatic  [] Abnormal -   [x] Mouth/Throat: Mucous membranes are moist    External Ears [x] Normal  [] Abnormal -    Neck: [x] No visualized mass [] Abnormal -     Pulmonary/Chest: [x] Respiratory effort normal   [x] No visualized signs of difficulty breathing or respiratory distress        [] Abnormal -      Musculoskeletal:   [x] Normal gait with no signs of ataxia         [x] Normal range of motion of neck        [] Abnormal -     Neurological:        [x] No Facial Asymmetry (Cranial nerve 7 motor function) (limited exam due to video visit)          [x] No gaze palsy        [] Abnormal -             Psychiatric:       [x] Normal Affect [] Abnormal -   Demented. [x] No Hallucinations    Other pertinent observable physical exam findings:-        We discussed the expected course, resolution and complications of the diagnosis(es) in detail. Medication risks, benefits, costs, interactions, and alternatives were discussed as indicated. I advised her to contact the office if her condition worsens, changes or fails to improve as anticipated. She expressed understanding with the diagnosis(es) and plan. Nyoka Bosworth, was evaluated through a synchronous (real-time) audio-video encounter. The patient (or guardian if applicable) is aware that this is a billable service. Verbal consent to proceed has been obtained within the past 12 months. The visit was conducted pursuant to the emergency declaration under the [address] [place] [address] authority and the [employer] and [employer] General Act. Patient identification was verified, and a caregiver was present when appropriate. The patient was located in a state where the provider was credentialed to provide care.       Christy Guajardo MD

## 2021-06-23 NOTE — PROGRESS NOTES
Trey Hooks is a 80 y.o. female  HIPAA verified by two patient identifiers. Health Maintenance Due   Topic    COVID-19 Vaccine (1)    DTaP/Tdap/Td series (1 - Tdap)    Shingrix Vaccine Age 49> (1 of 2)    Pneumococcal 65+ years (2 of 2 - PPSV23)     Chief Complaint   Patient presents with    Follow-up     Patient not sleeping ,states the need for another medication other than Seroquel     Patient-Reported Vitals 6/23/2021   Patient-Reported Weight 141lb       Pain Scale: 0/10  Pain Location:           1. Have you been to the ER, urgent care clinic since your last visit? Hospitalized since your last visit? No    2. Have you seen or consulted any other health care providers outside of the [address] [place] [address] since your last visit? Include any pap smears or colon screening.  No

## 2021-06-23 NOTE — LETTER
6/23/2021    Ms. Jonh Feng  [address] [facility] [address]      Dear Jonh Feng:    Please find your most recent results below. Resulted Orders   CBC WITH AUTOMATED DIFF   Result Value Ref Range    WBC 4.6 3.4 - 10.8 x10E3/uL    RBC 5.44 (H) 3.77 - 5.28 x10E6/uL    HGB 12.7 11.1 - 15.9 g/dL    HCT 40.4 34.0 - 46.6 %    MCV 74 (L) 79 - 97 fL    MCH 23.3 (L) 26.6 - 33.0 pg    MCHC 31.4 (L) 31.5 - 35.7 g/dL    RDW 15.8 (H) 11.7 - 15.4 %    PLATELET 716 153 - 417 x10E3/uL    NEUTROPHILS 50 Not Estab. %    Lymphocytes 34 Not Estab. %    MONOCYTES 9 Not Estab. %    EOSINOPHILS 6 Not Estab. %    BASOPHILS 1 Not Estab. %    ABS. NEUTROPHILS 2.3 1.4 - 7.0 x10E3/uL    Abs Lymphocytes 1.6 0.7 - 3.1 x10E3/uL    ABS. MONOCYTES 0.4 0.1 - 0.9 x10E3/uL    ABS. EOSINOPHILS 0.3 0.0 - 0.4 x10E3/uL    ABS. BASOPHILS 0.0 0.0 - 0.2 x10E3/uL    IMMATURE GRANULOCYTES 0 Not Estab. %    ABS. IMM. GRANS. 0.0 0.0 - 0.1 x10E3/uL    Narrative    Performed at:  [address]  [id number]  : Maddy Whaley MD, Phone:  [phone]   METABOLIC PANEL, COMPREHENSIVE   Result Value Ref Range    Glucose 84 65 - 99 mg/dL    BUN 12 8 - 27 mg/dL    Creatinine 1.14 (H) 0.57 - 1.00 mg/dL    GFR est non-AA 43 (L) >59 mL/min/1.73    GFR est AA 50 (L) >59 mL/min/1.73    BUN/Creatinine ratio 11 (L) 12 - 28    Sodium 144 134 - 144 mmol/L    Potassium 4.6 3.5 - 5.2 mmol/L    Chloride 105 96 - 106 mmol/L    CO2 26 20 - 29 mmol/L    Calcium 9.4 8.7 - 10.3 mg/dL    Protein, total 6.9 6.0 - 8.5 g/dL    Albumin 3.9 3.6 - 4.6 g/dL    GLOBULIN, TOTAL 3.0 1.5 - 4.5 g/dL    A-G Ratio 1.3 1.2 - 2.2    Bilirubin, total 0.4 0.0 - 1.2 mg/dL    Alk.  phosphatase 87 48 - 121 IU/L    AST (SGOT) 19 0 - 40 IU/L    ALT (SGPT) 13 0 - 32 IU/L    Narrative    Performed at:  [address]  [id number]  : Maddy Whaley MD, Phone:  [phone]   VITAMIN D, 25 HYDROXY   Result Value Ref Range    VITAMIN D, 25-HYDROXY 35.3 30.0 - 100.0 ng/mL    Narrative    Performed at:  [address]  [id number]  : Des Mcadams MD, Phone:  [phone]   LIPID PANEL   Result Value Ref Range    Cholesterol, total 160 100 - 199 mg/dL    Triglyceride 82 0 - 149 mg/dL    HDL Cholesterol 50 >39 mg/dL    VLDL, calculated 16 5 - 40 mg/dL    LDL, calculated 94 0 - 99 mg/dL    Narrative    Performed at:  [address]  [id number]  : Des Mcadams MD, Phone:  [phone]   CVD REPORT   Result Value Ref Range    INTERPRETATION Note     Narrative    Performed at:  [address]  [id number]  : Migue Louise MD, Phone:  [phone]   CKD REPORT   Result Value Ref Range    Interpretation Note     Narrative    Performed at:  [address]  [address]  [id number]  : Migue Louise MD, Phone:  [phone]   DEXA BONE DENSITY STUDY AXIAL    Narrative    Bone Mineral Density     Indication:  Osteoporosis on Fosamax  Age: [de-identified]  Sex: Female. Menopause status: postmenopausal.  Hormone replacement therapy: None    Number of falls in the past year:   None. Risk factors for osteoporosis:  None. Current medication for osteoporosis: Fosamax, calcium, vitamin D.      Comparison: 2015     Technique: Imaging was performed on the [facility] ????       Excluded sites: 0      Findings:     Fractures identified on Lateral scanogram:  0     Femoral Neck Left:  Bone mineral density (gm/cm2):  0.810 g/cm?  % of peak bone mass:  78%  % for age matched controls:  100%  T-score:  -1.6   Z-score:  0.0     Femoral Neck Right:  Bone mineral density (gm/cm2):  0.792 g/cm?  % of peak bone mass:  76%  % for age matched controls:  98%  T-score:  -1.8   Z-score:  -0.1      Total Hip Left:  Bone mineral density (gm/cm2): 0.774 g/cm?  % of peak bone mass:  77%  % for age matched controls:  95%  T-score:  -1.9   Z-score:  -0.3     Total Hip Right:  Bone mineral density (gm/cm2):  0.795 g/cm?  % of peak bone mass:  79%  % for age matched controls:  97%  T-score:  -1.7   Z-score:  -0.2     Lumbar Spine:  L1-4 or specify  Bone mineral density (gm/cm2):  0.982 g/cm?  % of peak bone mass:  83%  % for age matched controls:  95%  T-score:  -1.7   Z-score:  -0.4     33% Radius Left:  Bone mineral density (gm/cm2):  0.498 g/cm?  % of peak bone mass:  57%  % for age matched controls:  57%  T-score:  -4.3   Z-score:  -1.6       Impression       This patient is osteoporotic using the World Health Organization criteria  As compared to the prior study, there has been increase in density in the lumbar  spine. The patient is still in the osteoporotic category because of inclusion of  the forearm. Recommendations:  Therapy recommendations need to be tailored to each individual patient. Please reconsider testing based on risk factors. Currently, Medicare will only  reimburse for a central DXA examination every two years, unless the patient is  on chronic glucocorticoid therapy. Note: Please note that reliable, valid comparisons cannot be made between  studies which have been performed on machines from different manufacturers. If  clinically warranted, a follow up study performed at this site, on the same  unit, would allow the most sensitive assessment of change in bone mineral  density. RECOMMENDATIONS:  None. Keep up the good work!     Please call me if you have any questions: [phone]    Sincerely,      Pop Ace MD

## 2021-06-23 NOTE — LETTER
6/23/2021    Ms. Trey Hooks  [address] [facility] [facility] [address]      Dear Trey Hooks:    Please find your most recent results below. Resulted Orders   DEXA BONE DENSITY STUDY AXIAL    Narrative    Bone Mineral Density     Indication:  Osteoporosis on Fosamax  Age: [de-identified]  Sex: Female. Menopause status: postmenopausal.  Hormone replacement therapy: None    Number of falls in the past year:   None. Risk factors for osteoporosis:  None. Current medication for osteoporosis: Fosamax, calcium, vitamin D. Comparison: 2015     Technique: Imaging was performed on the [employer]       Excluded sites: 0      Findings:     Fractures identified on Lateral scanogram:  0     Femoral Neck Left:  Bone mineral density (gm/cm2):  0.810 g/cm?  % of peak bone mass:  78%  % for age matched controls:  100%  T-score:  -1.6   Z-score:  0.0     Femoral Neck Right:  Bone mineral density (gm/cm2):  0.792 g/cm?  % of peak bone mass:  76%  % for age matched controls:  98%  T-score:  -1.8   Z-score:  -0.1      Total Hip Left:  Bone mineral density (gm/cm2):  0.774 g/cm?  % of peak bone mass:  77%  % for age matched controls:  95%  T-score:  -1.9   Z-score:  -0.3     Total Hip Right:  Bone mineral density (gm/cm2):  0.795 g/cm?  % of peak bone mass:  79%  % for age matched controls:  97%  T-score:  -1.7   Z-score:  -0.2     Lumbar Spine:  L1-4 or specify  Bone mineral density (gm/cm2):  0.982 g/cm?  % of peak bone mass:  83%  % for age matched controls:  95%  T-score:  -1.7   Z-score:  -0.4     33% Radius Left:  Bone mineral density (gm/cm2):  0.498 g/cm?  % of peak bone mass:  57%  % for age matched controls:  57%  T-score:  -4.3   Z-score:  -1.6       Impression       This patient is osteoporotic using the World Health Organization criteria  As compared to the prior study, there has been increase in density in the lumbar  spine.  The patient is still in the osteoporotic category because of inclusion of  the forearm. Recommendations:  Therapy recommendations need to be tailored to each individual patient. Please reconsider testing based on risk factors. Currently, Medicare will only  reimburse for a central DXA examination every two years, unless the patient is  on chronic glucocorticoid therapy. Note: Please note that reliable, valid comparisons cannot be made between  studies which have been performed on machines from different manufacturers. If  clinically warranted, a follow up study performed at this site, on the same  unit, would allow the most sensitive assessment of change in bone mineral  density. RECOMMENDATIONS:  Bone density scan indicates osteoporosis, with improvement from previous study. Continue Fosamax.       Please call me if you have any questions: [phone]    Sincerely,      Jermaine Parker NP for [facility]

## 2021-07-18 DIAGNOSIS — M81.0 OSTEOPOROSIS, UNSPECIFIED OSTEOPOROSIS TYPE, UNSPECIFIED PATHOLOGICAL FRACTURE PRESENCE: ICD-10-CM

## 2021-07-19 RX ORDER — ALENDRONATE SODIUM 70 MG/1
TABLET ORAL
Qty: 12 TABLET | Refills: 3 | Status: SHIPPED | OUTPATIENT
Start: 2021-07-19

## 2021-08-03 DIAGNOSIS — M17.9 OSTEOARTHRITIS OF KNEE, UNSPECIFIED LATERALITY, UNSPECIFIED OSTEOARTHRITIS TYPE: ICD-10-CM

## 2021-08-03 RX ORDER — MELOXICAM 7.5 MG/1
TABLET ORAL
Qty: 90 TABLET | Refills: 0 | Status: SHIPPED | OUTPATIENT
Start: 2021-08-03 | End: 2021-11-01

## 2021-08-21 DIAGNOSIS — G47.00 INSOMNIA, UNSPECIFIED TYPE: ICD-10-CM

## 2021-08-21 DIAGNOSIS — F02.80 DEMENTIA DUE TO MEDICAL CONDITION WITHOUT BEHAVIORAL DISTURBANCE (HCC): ICD-10-CM

## 2021-08-23 RX ORDER — QUETIAPINE FUMARATE 25 MG/1
TABLET, FILM COATED ORAL
Qty: 45 TABLET | Refills: 1 | Status: SHIPPED | OUTPATIENT
Start: 2021-08-23 | End: 2021-09-01 | Stop reason: SDUPTHER

## 2021-09-01 ENCOUNTER — VIRTUAL VISIT (OUTPATIENT)
Dept: INTERNAL MEDICINE CLINIC | Age: 86
End: 2021-09-01
Payer: MEDICARE

## 2021-09-01 DIAGNOSIS — F02.80 DEMENTIA DUE TO MEDICAL CONDITION WITHOUT BEHAVIORAL DISTURBANCE (HCC): ICD-10-CM

## 2021-09-01 DIAGNOSIS — M81.0 OSTEOPOROSIS, UNSPECIFIED OSTEOPOROSIS TYPE, UNSPECIFIED PATHOLOGICAL FRACTURE PRESENCE: ICD-10-CM

## 2021-09-01 DIAGNOSIS — I10 ESSENTIAL HYPERTENSION: Primary | ICD-10-CM

## 2021-09-01 DIAGNOSIS — G47.00 INSOMNIA, UNSPECIFIED TYPE: ICD-10-CM

## 2021-09-01 DIAGNOSIS — F05 SUNDOWNING: ICD-10-CM

## 2021-09-01 PROCEDURE — 1090F PRES/ABSN URINE INCON ASSESS: CPT | Performed by: INTERNAL MEDICINE

## 2021-09-01 PROCEDURE — G8427 DOCREV CUR MEDS BY ELIG CLIN: HCPCS | Performed by: INTERNAL MEDICINE

## 2021-09-01 PROCEDURE — 99214 OFFICE O/P EST MOD 30 MIN: CPT | Performed by: INTERNAL MEDICINE

## 2021-09-01 PROCEDURE — G9717 DOC PT DX DEP/BP F/U NT REQ: HCPCS | Performed by: INTERNAL MEDICINE

## 2021-09-01 PROCEDURE — G8421 BMI NOT CALCULATED: HCPCS | Performed by: INTERNAL MEDICINE

## 2021-09-01 PROCEDURE — 1101F PT FALLS ASSESS-DOCD LE1/YR: CPT | Performed by: INTERNAL MEDICINE

## 2021-09-01 PROCEDURE — G8536 NO DOC ELDER MAL SCRN: HCPCS | Performed by: INTERNAL MEDICINE

## 2021-09-01 RX ORDER — QUETIAPINE FUMARATE 25 MG/1
12.5 TABLET, FILM COATED ORAL
Qty: 45 TABLET | Refills: 1 | Status: SHIPPED | OUTPATIENT
Start: 2021-09-01 | End: 2021-11-26 | Stop reason: ALTCHOICE

## 2021-09-01 NOTE — PROGRESS NOTES
Rohit Kidd is a 80 y.o. female who was seen by synchronous (real-time) audio-video technology on 9/1/2021 for Hypertension, Dementia, and Cholesterol Problem        Assessment & Plan:   Diagnoses and all orders for this visit:    1. Essential hypertension  Stable blood pressure. On Lotrel. Doing well. 2. Sundowning  Better with the low-dose of Seroquel, he never discontinued it. Son believes this is helping her. Only using Xanax as needed for agitation. No need for refill right now. 3. 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 ([address]    On Exelon patch and Namenda. Will call in,  -     QUEtiapine (SEROquel) 25 mg tablet; Take 0.5 Tablets by mouth nightly. Will order,shower chair to use daily. 4. Insomnia, unspecified type    We will refill,  -     QUEtiapine (SEROquel) 25 mg tablet; Take 0.5 Tablets by mouth nightly. 5. Osteoporosis, unspecified osteoporosis type, unspecified pathological fracture presence    Bone density shows osteoporosis. She is on Fosamax. Advised to take calcium and vitamin D also. I spent at least 30 minutes on this visit with this established patient. Subjective:   Letter is here for follow-up. She is staying with her son Campbell Mello is house. Her son is her caregiver. According to son mom can be mean sometimes because of ongoing dementia. Agitation is controlled with low-dose of Seroquel. She is sleeping okay. As she is taking Xanax as needed for agitation also. No need for refill right now. Has advanced dementia. On medications. Doing well. Has hypertension, compliant with medication. Denies chest pain palpitation shortness of breath. Bone density done recently, showed osteoporosis, Fosamax called in. Also need calcium and vitamin D. Did not receive Covid vaccine, however family do not believe one Covid vaccine. Advised them to take Covid vaccine. Labs reviewed, seems stable.     Prior to Admission medications    Medication Sig Start Date End Date Taking? Authorizing Provider   QUEtiapine (SEROquel) 25 mg tablet Take 0.5 Tablets by mouth nightly. 9/1/21  Yes Gideon Ruiz MD   meloxicam (MOBIC) 7.5 mg tablet TAKE 1 TABLET BY MOUTH EVERY DAY 8/3/21  Yes Gideon Ruiz MD   alendronate (FOSAMAX) 70 mg tablet TAKE 1 TABLET BY MOUTH EVERY SUNDAY 7/19/21  Yes Gideon Ruiz MD   ALPRAZolam Edwin Cavazos) 0.25 mg tablet Take 1 Tablet by mouth nightly as needed for Anxiety. Max Daily Amount: 0.25 mg. DC seraquel 6/23/21  Yes Gideon Ruiz MD   Calcium-Cholecalciferol, D3, (Calcium 500 With D) 500 mg(1,250mg) -400 unit tab 1 tab po q Am 5/19/21  Yes Gideon Ruiz MD   memantine (NAMENDA) 10 mg tablet TAKE 1 TABLET BY MOUTH DAILY 5/5/21  Yes Gideon Ruiz MD   amLODIPine-benazepril (LOTREL) 5-20 mg per capsule TAKE 1 CAPSULE BY MOUTH EVERY DAY 5/5/21  Yes Gideon Ruiz MD   rivastigmine (EXELON) 13.3 mg/24 hour patch APPLY 1 PATCH EXTERNALLY TO THE SKIN EVERY DAY 5/5/21  Yes Marina Magana NP   clotrimazole-betamethasone (LOTRISONE) topical cream Apply  to affected area two (2) times a day. 12/11/20  Yes Gideon Ruiz MD   triamcinolone acetonide (KENALOG) 0.5 % ointment Apply  to affected area two (2) times a day. use thin layer 10/16/18  Yes Olga Leonard NP   acetaminophen (TYLENOL ARTHRITIS PAIN) 650 mg CR tablet Take 1 Tab by mouth two (2) times daily as needed for Pain. 7/8/15  Yes Gideon Ruiz MD   QUEtiapine (SEROquel) 25 mg tablet TAKE 1/2 TABLET BY MOUTH EVERY NIGHT 8/23/21 9/1/21  Gideon Ruiz MD     Past Medical History:   Diagnosis Date    Arthritis     back and knees    Dementia ([facility] 2/4/2013    mild    Hypercholesterolemia     Hypertension     Other ill-defined conditions(799.89)     elevated cholesterol    PUD (peptic ulcer disease)     Stroke ([facility]     tia    TIA (transient ischaemic attack)        ROS significant for dementia and agitation.     Objective:     Patient-Reported Vitals 6/23/2021   Patient-Reported Weight 141lb Constitutional: [x] Appears well-developed and well-nourished [x] No apparent distress      [] Abnormal -     Mental status: [x] Alert and awake  [x] Oriented to person/place/time [x] Able to follow commands    [] Abnormal -       HENT: [x] Normocephalic, atraumatic  [] Abnormal -   [x] Mouth/Throat: Mucous membranes are moist    External Ears [x] Normal  [] Abnormal -    Neck: [x] No visualized mass [] Abnormal -     Pulmonary/Chest: [x] Respiratory effort normal   [x] No visualized signs of difficulty breathing or respiratory distress        [] Abnormal -      Musculoskeletal:   [x] Normal gait with no signs of ataxia         [x] Normal range of motion of neck        [] Abnormal -     Neurological:        [x] No Facial Asymmetry (Cranial nerve 7 motor function) (limited exam due to video visit)          [x] No gaze palsy        [] Abnormal -          Skin:        [x] No significant exanthematous lesions or discoloration noted on facial skin         [] Abnormal -            Psychiatric:       [x] Normal Affect [] Abnormal -   Advanced dementia present. Able to communicate well. Not agitated right now     [x] No Hallucinations    Other pertinent observable physical exam findings:-        We discussed the expected course, resolution and complications of the diagnosis(es) in detail. Medication risks, benefits, costs, interactions, and alternatives were discussed as indicated. I advised her to contact the office if her condition worsens, changes or fails to improve as anticipated. She expressed understanding with the diagnosis(es) and plan. Charlette Cary, was evaluated through a synchronous (real-time) audio-video encounter. The patient (or guardian if applicable) is aware that this is a billable service. Verbal consent to proceed has been obtained within the past 12 months.  The visit was conducted pursuant to the emergency declaration under the [facility] waiver authority and the [employer] General Act. Patient identification was verified, and a caregiver was present when appropriate. The patient was located in a state where the provider was credentialed to provide care.       oSha Gray MD

## 2021-09-01 NOTE — PROGRESS NOTES
Chief Complaint   Patient presents with    Hypertension    Dementia    Cholesterol Problem     1. Have you been to the ER, urgent care clinic since your last visit? Hospitalized since your last visit? No    2. Have you seen or consulted any other health care providers outside of the [address] [place] [address] since your last visit? Include any pap smears or colon screening.  No    Pain-0

## 2021-09-13 ENCOUNTER — TELEPHONE (OUTPATIENT)
Dept: INTERNAL MEDICINE CLINIC | Age: 86
End: 2021-09-13

## 2021-09-13 NOTE — TELEPHONE ENCOUNTER
Pt is still unable to sleep at night.  The Xanax is not helping and caregiver wants patient to go back on Trazadone as soon as possible since that has worked for her in the past

## 2021-09-16 NOTE — TELEPHONE ENCOUNTER
Writer conferred with provider:    Please increase her Seroquel to 25 mg at night.          Call placed to [facility] and left message to return call    Mr Vale returned call and asked for writer to call Lexus Santana with provider recommendations, call placed to [facility] and left message to return call

## 2021-09-17 DIAGNOSIS — G47.00 INSOMNIA, UNSPECIFIED TYPE: Primary | ICD-10-CM

## 2021-09-17 RX ORDER — TRAZODONE HYDROCHLORIDE 50 MG/1
50 TABLET ORAL
Qty: 30 TABLET | Refills: 0 | Status: SHIPPED | OUTPATIENT
Start: 2021-09-17 | End: 2021-11-01

## 2021-09-17 NOTE — TELEPHONE ENCOUNTER
Granddaughter states this med is making pt come out of her clothes. This med is making her angry and more aggressive. Granddaughter states the seroquel med will not work for the patient. Pt daughter was put on trazadone- she had dementia also and this is the only medication that worked for her. Granddaughter cell # [phone].  She is upset because she has been waiting a week regarding this issue

## 2021-09-21 NOTE — TELEPHONE ENCOUNTER
Spoke w/ pt Granddaughter Vickie Dang to f/u on pt ( verified  and name)  And to let her know trazodone was sent to pharmacy on 21. Granddaughter states her grandma is doing a lot better with this medication dealing w/ the dementia.

## 2021-09-28 ENCOUNTER — TELEPHONE (OUTPATIENT)
Dept: INTERNAL MEDICINE CLINIC | Age: 86
End: 2021-09-28

## 2021-09-28 DIAGNOSIS — M17.0 PRIMARY OSTEOARTHRITIS OF BOTH KNEES: ICD-10-CM

## 2021-09-28 DIAGNOSIS — F03.90 DEMENTIA WITHOUT BEHAVIORAL DISTURBANCE, UNSPECIFIED DEMENTIA TYPE: ICD-10-CM

## 2021-09-28 DIAGNOSIS — M17.9 OSTEOARTHRITIS OF KNEE, UNSPECIFIED LATERALITY, UNSPECIFIED OSTEOARTHRITIS TYPE: ICD-10-CM

## 2021-09-28 DIAGNOSIS — M81.0 OSTEOPOROSIS, UNSPECIFIED OSTEOPOROSIS TYPE, UNSPECIFIED PATHOLOGICAL FRACTURE PRESENCE: Primary | ICD-10-CM

## 2021-09-28 DIAGNOSIS — F02.80 LATE ONSET ALZHEIMER'S DISEASE WITHOUT BEHAVIORAL DISTURBANCE (HCC): ICD-10-CM

## 2021-09-28 DIAGNOSIS — G30.1 LATE ONSET ALZHEIMER'S DISEASE WITHOUT BEHAVIORAL DISTURBANCE (HCC): ICD-10-CM

## 2021-09-28 NOTE — TELEPHONE ENCOUNTER
Pt needs a script for a shower chair   [facility]  Portal site for login/ set up account to send e-script [employer]

## 2021-09-29 NOTE — TELEPHONE ENCOUNTER
Order placed and awaiting provider progress note and will fax to [facility] per patient request

## 2021-11-01 DIAGNOSIS — F03.90 DEMENTIA WITHOUT BEHAVIORAL DISTURBANCE, UNSPECIFIED DEMENTIA TYPE: ICD-10-CM

## 2021-11-01 DIAGNOSIS — G47.00 INSOMNIA, UNSPECIFIED TYPE: ICD-10-CM

## 2021-11-01 DIAGNOSIS — M17.9 OSTEOARTHRITIS OF KNEE, UNSPECIFIED LATERALITY, UNSPECIFIED OSTEOARTHRITIS TYPE: ICD-10-CM

## 2021-11-01 DIAGNOSIS — I10 ESSENTIAL HYPERTENSION: ICD-10-CM

## 2021-11-01 RX ORDER — AMLODIPINE AND BENAZEPRIL HYDROCHLORIDE 5; 20 MG/1; MG/1
CAPSULE ORAL
Qty: 90 CAPSULE | Refills: 1 | Status: SHIPPED | OUTPATIENT
Start: 2021-11-01 | End: 2022-05-02

## 2021-11-01 RX ORDER — MEMANTINE HYDROCHLORIDE 10 MG/1
TABLET ORAL
Qty: 90 TABLET | Refills: 1 | Status: SHIPPED | OUTPATIENT
Start: 2021-11-01 | End: 2022-05-03 | Stop reason: SDUPTHER

## 2021-11-01 RX ORDER — TRAZODONE HYDROCHLORIDE 50 MG/1
TABLET ORAL
Qty: 30 TABLET | Refills: 0 | Status: SHIPPED | OUTPATIENT
Start: 2021-11-01 | End: 2021-12-01

## 2021-11-01 RX ORDER — MELOXICAM 7.5 MG/1
TABLET ORAL
Qty: 90 TABLET | Refills: 0 | Status: SHIPPED | OUTPATIENT
Start: 2021-11-01 | End: 2021-11-26 | Stop reason: ALTCHOICE

## 2021-11-26 ENCOUNTER — OFFICE VISIT (OUTPATIENT)
Dept: INTERNAL MEDICINE CLINIC | Age: 86
End: 2021-11-26
Payer: MEDICARE

## 2021-11-26 VITALS
SYSTOLIC BLOOD PRESSURE: 120 MMHG | RESPIRATION RATE: 14 BRPM | BODY MASS INDEX: 22.32 KG/M2 | WEIGHT: 126 LBS | TEMPERATURE: 97.3 F | HEIGHT: 63 IN | OXYGEN SATURATION: 93 % | DIASTOLIC BLOOD PRESSURE: 72 MMHG | HEART RATE: 83 BPM

## 2021-11-26 DIAGNOSIS — Z00.00 MEDICARE ANNUAL WELLNESS VISIT, SUBSEQUENT: Primary | ICD-10-CM

## 2021-11-26 DIAGNOSIS — F02.80 DEMENTIA DUE TO MEDICAL CONDITION WITHOUT BEHAVIORAL DISTURBANCE (HCC): ICD-10-CM

## 2021-11-26 DIAGNOSIS — E78.00 HYPERCHOLESTEROLEMIA: ICD-10-CM

## 2021-11-26 DIAGNOSIS — F03.90 DEMENTIA WITHOUT BEHAVIORAL DISTURBANCE, UNSPECIFIED DEMENTIA TYPE: ICD-10-CM

## 2021-11-26 DIAGNOSIS — I10 HYPERTENSION, UNSPECIFIED TYPE: ICD-10-CM

## 2021-11-26 PROCEDURE — 1101F PT FALLS ASSESS-DOCD LE1/YR: CPT | Performed by: INTERNAL MEDICINE

## 2021-11-26 PROCEDURE — G9717 DOC PT DX DEP/BP F/U NT REQ: HCPCS | Performed by: INTERNAL MEDICINE

## 2021-11-26 PROCEDURE — G8420 CALC BMI NORM PARAMETERS: HCPCS | Performed by: INTERNAL MEDICINE

## 2021-11-26 PROCEDURE — G8427 DOCREV CUR MEDS BY ELIG CLIN: HCPCS | Performed by: INTERNAL MEDICINE

## 2021-11-26 PROCEDURE — G8536 NO DOC ELDER MAL SCRN: HCPCS | Performed by: INTERNAL MEDICINE

## 2021-11-26 PROCEDURE — G0439 PPPS, SUBSEQ VISIT: HCPCS | Performed by: INTERNAL MEDICINE

## 2021-11-26 RX ORDER — RIVASTIGMINE 13.3 MG/24H
PATCH, EXTENDED RELEASE TRANSDERMAL
Qty: 90 PATCH | Refills: 1 | Status: SHIPPED | OUTPATIENT
Start: 2021-11-26

## 2021-11-26 NOTE — PROGRESS NOTES
Health Maintenance Due   Topic Date Due    COVID-19 Vaccine (1) Never done    DTaP/Tdap/Td series (1 - Tdap) Never done    Shingrix Vaccine Age 50> (1 of 2) Never done    Pneumococcal 65+ years (2 of 2 - PPSV23) 10/16/2018    Flu Vaccine (1) 09/01/2021    Medicare Yearly Exam  11/12/2021       Chief Complaint   Patient presents with    Dementia     follow up    Hypertension     follow up    Cholesterol Problem     follow up       1. Have you been to the ER, urgent care clinic since your last visit? Hospitalized since your last visit? No    2. Have you seen or consulted any other health care providers outside of the [address] [place] [address] since your last visit? Include any pap smears or colon screening. No    3) Do you have an Advance Directive on file? yes    4) Are you interested in receiving information on Advance Directives? NO      Patient is accompanied by son I have received verbal consent from Dayna Zacarias to discuss any/all medical information while they are present in the room.

## 2021-11-26 NOTE — PROGRESS NOTES
HISTORY OF PRESENT ILLNESS  Gonzalez Rodriguez is a 80 y.o. female. Patient was seen for her medicare wellness and follow. Is a patient of Dr. Stephenie Ya. Son is with her. She stays at home with her niece. Reports no concerns today. Does not drink a lot of water a home. This has been an ongoing issue. Does need to assistance of her family. Does not bathe or dress herself. Had a fall a few weeks ago. Was rushing to get something off the floor and tripped. Does not want any vaccines today , per son. Seroquel was stopped due to adverse reactions  Visit Vitals  /72 (BP 1 Location: Left arm, BP Patient Position: Sitting, BP Cuff Size: Adult)   Pulse 83   Temp 97.3 °F (36.3 °C) (Oral)   Resp 14   Ht 5' 3\" (1.6 m)   Wt 126 lb (57.2 kg)   SpO2 93%   BMI 22.32 kg/m²     Past Medical History:   Diagnosis Date    Arthritis     back and knees    Dementia ([address] 2/4/2013    mild    Hypercholesterolemia     Hypertension     Other ill-defined conditions(799.89)     elevated cholesterol    PUD (peptic ulcer disease)     Stroke ([address]     tia    TIA (transient ischaemic attack)      Past Surgical History:   Procedure Laterality Date    CARDIAC CATHETERIZATION  10/1990    normal    COLONOSCOPY  [id number]    diverticuli, hemorrhoids    EGD      x 3    EGD  [id number]    gastritis, duodenitis    HX CATARACT REMOVAL      bilateral    HX GYN      BTL    HX HEENT      tonsillectomy    HX ORTHOPAEDIC  2/2011    right knee replacement- Dr. Jenny Guerra    HX ORTHOPAEDIC  12/10/12     LEFT TOTAL KNEE ARTHROPLASTY  WITH NAVIGATION (EPI W SPINAL ANES).     HX OTHER SURGICAL  02/2002    removal of meningioma    HX OTHER SURGICAL      colonoscopy/EGD    NEUROLOGICAL PROCEDURE UNLISTED      brain tumor resection yrs back     Family History   Problem Relation Age of Onset    Hypertension Mother    Akira Spina Elevated Lipids Mother     Dementia Mother     Elevated Lipids Sister     Dementia Sister     Cancer Brother colon    No Known Problems Child     No Known Problems Child     No Known Problems Child     No Known Problems Child      Outpatient Encounter Medications as of 11/26/2021   Medication Sig Dispense Refill    rivastigmine (EXELON) 13.3 mg/24 hour patch Apply a patch every every 24 hours 90 Patch 1    amLODIPine-benazepril (LOTREL) 5-20 mg per capsule TAKE 1 CAPSULE BY MOUTH EVERY DAY 90 Capsule 1    memantine (NAMENDA) 10 mg tablet TAKE 1 TABLET BY MOUTH DAILY 90 Tablet 1    traZODone (DESYREL) 50 mg tablet TAKE 1 TABLET BY MOUTH EVERY NIGHT 30 Tablet 0    alendronate (FOSAMAX) 70 mg tablet TAKE 1 TABLET BY MOUTH EVERY SUNDAY 12 Tablet 3    ALPRAZolam (XANAX) 0.25 mg tablet Take 1 Tablet by mouth nightly as needed for Anxiety. Max Daily Amount: 0.25 mg. DC seraquel 30 Tablet 1    Calcium-Cholecalciferol, D3, (Calcium 500 With D) 500 mg(1,250mg) -400 unit tab 1 tab po q Am 90 Tablet 3    acetaminophen (TYLENOL ARTHRITIS PAIN) 650 mg CR tablet Take 1 Tab by mouth two (2) times daily as needed for Pain. 60 Tab 3    [DISCONTINUED] meloxicam (MOBIC) 7.5 mg tablet TAKE 1 TABLET BY MOUTH EVERY DAY (Patient not taking: Reported on 11/26/2021) 90 Tablet 0    OTHER Shower chair 1 Each 0    [DISCONTINUED] QUEtiapine (SEROquel) 25 mg tablet Take 0.5 Tablets by mouth nightly. (Patient not taking: Reported on 11/26/2021) 45 Tablet 1    [DISCONTINUED] rivastigmine (EXELON) 13.3 mg/24 hour patch APPLY 1 PATCH EXTERNALLY TO THE SKIN EVERY DAY 90 Patch 1    [DISCONTINUED] clotrimazole-betamethasone (LOTRISONE) topical cream Apply  to affected area two (2) times a day. (Patient not taking: Reported on 11/26/2021) 15 g 0    [DISCONTINUED] triamcinolone acetonide (KENALOG) 0.5 % ointment Apply  to affected area two (2) times a day. use thin layer (Patient not taking: Reported on 11/26/2021) 30 g 0     No facility-administered encounter medications on file as of 11/26/2021.      HPI    Review of Systems   Unable to perform ROS: Dementia       Physical Exam  Vitals and nursing note reviewed. Cardiovascular:      Rate and Rhythm: Normal rate and regular rhythm. Pulmonary:      Effort: Pulmonary effort is normal.      Breath sounds: Normal breath sounds. Abdominal:      Palpations: Abdomen is soft. Musculoskeletal:         General: Normal range of motion. Skin:     General: Skin is warm. Neurological:      Mental Status: She is alert. Mental status is at baseline. Psychiatric:         Behavior: Behavior normal.         ASSESSMENT and PLAN  Diagnoses and all orders for this visit:    1. Medicare annual wellness visit, subsequent    2. 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 Continue patch and namenda   3. Hypertension, unspecified type    4. Hypercholesterolemia    5. Dementia without behavioral disturbance, unspecified dementia type (HCC)  -     rivastigmine (EXELON) 13.3 mg/24 hour patch;  Apply a patch every every 24 hours      Follow-up and Dispositions    · Return in about 6 months (around 5/26/2022), or if symptoms worsen or fail to improve.       lab results and schedule of future lab studies reviewed with patient  reviewed diet, exercise and weight control  cardiovascular risk and specific lipid/LDL goals reviewed  reviewed medications and side effects in detail

## 2021-12-01 DIAGNOSIS — G47.00 INSOMNIA, UNSPECIFIED TYPE: ICD-10-CM

## 2021-12-01 RX ORDER — TRAZODONE HYDROCHLORIDE 50 MG/1
TABLET ORAL
Qty: 30 TABLET | Refills: 0 | Status: SHIPPED | OUTPATIENT
Start: 2021-12-01 | End: 2022-01-03

## 2021-12-31 DIAGNOSIS — G47.00 INSOMNIA, UNSPECIFIED TYPE: ICD-10-CM

## 2022-01-03 RX ORDER — TRAZODONE HYDROCHLORIDE 50 MG/1
TABLET ORAL
Qty: 30 TABLET | Refills: 0 | Status: SHIPPED | OUTPATIENT
Start: 2022-01-03 | End: 2022-01-30

## 2022-01-30 DIAGNOSIS — M17.9 OSTEOARTHRITIS OF KNEE, UNSPECIFIED LATERALITY, UNSPECIFIED OSTEOARTHRITIS TYPE: ICD-10-CM

## 2022-01-30 DIAGNOSIS — G47.00 INSOMNIA, UNSPECIFIED TYPE: ICD-10-CM

## 2022-01-30 RX ORDER — TRAZODONE HYDROCHLORIDE 50 MG/1
TABLET ORAL
Qty: 30 TABLET | Refills: 0 | Status: SHIPPED | OUTPATIENT
Start: 2022-01-30 | End: 2022-03-01

## 2022-01-31 RX ORDER — MELOXICAM 7.5 MG/1
TABLET ORAL
Qty: 90 TABLET | Refills: 0 | Status: SHIPPED | OUTPATIENT
Start: 2022-01-31 | End: 2022-05-02

## 2022-03-01 DIAGNOSIS — G47.00 INSOMNIA, UNSPECIFIED TYPE: ICD-10-CM

## 2022-03-01 RX ORDER — TRAZODONE HYDROCHLORIDE 50 MG/1
TABLET ORAL
Qty: 30 TABLET | Refills: 0 | Status: SHIPPED | OUTPATIENT
Start: 2022-03-01 | End: 2022-03-31

## 2022-03-31 DIAGNOSIS — G47.00 INSOMNIA, UNSPECIFIED TYPE: ICD-10-CM

## 2022-03-31 RX ORDER — TRAZODONE HYDROCHLORIDE 50 MG/1
TABLET ORAL
Qty: 30 TABLET | Refills: 0 | Status: SHIPPED | OUTPATIENT
Start: 2022-03-31

## 2022-04-30 DIAGNOSIS — I10 ESSENTIAL HYPERTENSION: ICD-10-CM

## 2022-04-30 DIAGNOSIS — M17.9 OSTEOARTHRITIS OF KNEE, UNSPECIFIED LATERALITY, UNSPECIFIED OSTEOARTHRITIS TYPE: ICD-10-CM

## 2022-05-02 RX ORDER — AMLODIPINE AND BENAZEPRIL HYDROCHLORIDE 5; 20 MG/1; MG/1
CAPSULE ORAL
Qty: 90 CAPSULE | Refills: 1 | Status: SHIPPED | OUTPATIENT
Start: 2022-05-02

## 2022-05-02 RX ORDER — MELOXICAM 7.5 MG/1
TABLET ORAL
Qty: 90 TABLET | Refills: 0 | Status: SHIPPED | OUTPATIENT
Start: 2022-05-02

## 2022-05-03 DIAGNOSIS — F03.90 DEMENTIA WITHOUT BEHAVIORAL DISTURBANCE, UNSPECIFIED DEMENTIA TYPE: ICD-10-CM

## 2022-05-03 RX ORDER — MEMANTINE HYDROCHLORIDE 10 MG/1
10 TABLET ORAL DAILY
Qty: 30 TABLET | Refills: 0 | Status: SHIPPED | OUTPATIENT
Start: 2022-05-03

## 2022-05-03 NOTE — TELEPHONE ENCOUNTER
Last OV: 11/26/2021  No upcoming     Notified pharmacy via fax patient needs a follow up appointment soon